# Patient Record
Sex: FEMALE | Race: WHITE | NOT HISPANIC OR LATINO | URBAN - METROPOLITAN AREA
[De-identification: names, ages, dates, MRNs, and addresses within clinical notes are randomized per-mention and may not be internally consistent; named-entity substitution may affect disease eponyms.]

---

## 2017-01-11 ENCOUNTER — APPOINTMENT (RX ONLY)
Dept: URBAN - METROPOLITAN AREA CLINIC 24 | Facility: CLINIC | Age: 62
Setting detail: DERMATOLOGY
End: 2017-01-11

## 2017-01-11 DIAGNOSIS — L82.1 OTHER SEBORRHEIC KERATOSIS: ICD-10-CM

## 2017-01-11 DIAGNOSIS — Z48.02 ENCOUNTER FOR REMOVAL OF SUTURES: ICD-10-CM

## 2017-01-11 PROCEDURE — ? SUTURE REMOVAL (GLOBAL PERIOD)

## 2017-01-11 PROCEDURE — ? PRESCRIPTION

## 2017-01-11 PROCEDURE — ? ORDER TESTS

## 2017-01-11 PROCEDURE — ? LIQUID NITROGEN (COSMETIC)

## 2017-01-11 PROCEDURE — ? OTHER

## 2017-01-11 RX ORDER — DOXYCYCLINE HYCLATE 100 MG/1
CAPSULE, GELATIN COATED ORAL
Qty: 20 | Refills: 0 | Status: ERX | COMMUNITY
Start: 2017-01-11 | End: 2019-03-12

## 2017-01-11 RX ADMIN — DOXYCYCLINE HYCLATE: 100 CAPSULE, GELATIN COATED ORAL at 00:00

## 2017-01-11 ASSESSMENT — LOCATION SIMPLE DESCRIPTION DERM
LOCATION SIMPLE: RIGHT BREAST
LOCATION SIMPLE: LEFT TEMPLE
LOCATION SIMPLE: LEFT CHEEK

## 2017-01-11 ASSESSMENT — LOCATION DETAILED DESCRIPTION DERM
LOCATION DETAILED: LEFT CENTRAL BUCCAL CHEEK
LOCATION DETAILED: RIGHT MEDIAL BREAST 12-1:00 REGION
LOCATION DETAILED: LEFT INFERIOR TEMPLE
LOCATION DETAILED: LEFT INFERIOR LATERAL MALAR CHEEK

## 2017-01-11 ASSESSMENT — LOCATION ZONE DERM
LOCATION ZONE: FACE
LOCATION ZONE: TRUNK

## 2017-01-11 NOTE — PROCEDURE: SUTURE REMOVAL (GLOBAL PERIOD)
Detail Level: Detailed
Add 39942 Cpt? (Important Note: In 2017 The Use Of 38081 Is Being Tracked By Cms To Determine Future Global Period Reimbursement For Global Periods): no

## 2017-01-11 NOTE — PROCEDURE: OTHER
Detail Level: Zone
Note Text (......Xxx Chief Complaint.): This diagnosis correlates with the
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

## 2019-03-13 ENCOUNTER — APPOINTMENT (RX ONLY)
Dept: URBAN - METROPOLITAN AREA CLINIC 24 | Facility: CLINIC | Age: 64
Setting detail: DERMATOLOGY
End: 2019-03-13

## 2019-03-13 DIAGNOSIS — L82.1 OTHER SEBORRHEIC KERATOSIS: ICD-10-CM

## 2019-03-13 DIAGNOSIS — L81.4 OTHER MELANIN HYPERPIGMENTATION: ICD-10-CM

## 2019-03-13 DIAGNOSIS — D17 BENIGN LIPOMATOUS NEOPLASM: ICD-10-CM

## 2019-03-13 DIAGNOSIS — L30.4 ERYTHEMA INTERTRIGO: ICD-10-CM

## 2019-03-13 DIAGNOSIS — Z87.2 PERSONAL HISTORY OF DISEASES OF THE SKIN AND SUBCUTANEOUS TISSUE: ICD-10-CM

## 2019-03-13 DIAGNOSIS — Z80.8 FAMILY HISTORY OF MALIGNANT NEOPLASM OF OTHER ORGANS OR SYSTEMS: ICD-10-CM

## 2019-03-13 DIAGNOSIS — D18.0 HEMANGIOMA: ICD-10-CM

## 2019-03-13 DIAGNOSIS — L57.0 ACTINIC KERATOSIS: ICD-10-CM

## 2019-03-13 DIAGNOSIS — D49.2 NEOPLASM OF UNSPECIFIED BEHAVIOR OF BONE, SOFT TISSUE, AND SKIN: ICD-10-CM

## 2019-03-13 DIAGNOSIS — D22 MELANOCYTIC NEVI: ICD-10-CM

## 2019-03-13 PROBLEM — D22.61 MELANOCYTIC NEVI OF RIGHT UPPER LIMB, INCLUDING SHOULDER: Status: ACTIVE | Noted: 2019-03-13

## 2019-03-13 PROBLEM — D48.5 NEOPLASM OF UNCERTAIN BEHAVIOR OF SKIN: Status: ACTIVE | Noted: 2019-03-13

## 2019-03-13 PROBLEM — D18.01 HEMANGIOMA OF SKIN AND SUBCUTANEOUS TISSUE: Status: ACTIVE | Noted: 2019-03-13

## 2019-03-13 PROCEDURE — ? BIOPSY BY SHAVE METHOD

## 2019-03-13 PROCEDURE — 17003 DESTRUCT PREMALG LES 2-14: CPT

## 2019-03-13 PROCEDURE — 11102 TANGNTL BX SKIN SINGLE LES: CPT | Mod: 59

## 2019-03-13 PROCEDURE — ? OTHER

## 2019-03-13 PROCEDURE — ? COUNSELING

## 2019-03-13 PROCEDURE — 17000 DESTRUCT PREMALG LESION: CPT | Mod: 59

## 2019-03-13 PROCEDURE — ? REFERRAL

## 2019-03-13 PROCEDURE — ? LIQUID NITROGEN

## 2019-03-13 PROCEDURE — ? SHAVE REMOVAL

## 2019-03-13 PROCEDURE — 11301 SHAVE SKIN LESION 0.6-1.0 CM: CPT | Mod: 59

## 2019-03-13 PROCEDURE — 99214 OFFICE O/P EST MOD 30 MIN: CPT | Mod: 25

## 2019-03-13 ASSESSMENT — LOCATION ZONE DERM
LOCATION ZONE: ARM
LOCATION ZONE: EYELID
LOCATION ZONE: TRUNK
LOCATION ZONE: LEG
LOCATION ZONE: NECK

## 2019-03-13 ASSESSMENT — LOCATION SIMPLE DESCRIPTION DERM
LOCATION SIMPLE: LEFT BREAST
LOCATION SIMPLE: LEFT SHOULDER
LOCATION SIMPLE: RIGHT UPPER BACK
LOCATION SIMPLE: RIGHT UPPER ARM
LOCATION SIMPLE: ABDOMEN
LOCATION SIMPLE: LEFT UPPER BACK
LOCATION SIMPLE: RIGHT SHOULDER
LOCATION SIMPLE: RIGHT BREAST
LOCATION SIMPLE: LEFT KNEE
LOCATION SIMPLE: RIGHT POSTERIOR UPPER ARM
LOCATION SIMPLE: LEFT FOREARM
LOCATION SIMPLE: LEFT INFERIOR EYELID
LOCATION SIMPLE: POSTERIOR NECK
LOCATION SIMPLE: CHEST

## 2019-03-13 ASSESSMENT — LOCATION DETAILED DESCRIPTION DERM
LOCATION DETAILED: RIGHT ANTERIOR PROXIMAL UPPER ARM
LOCATION DETAILED: LEFT DISTAL DORSAL FOREARM
LOCATION DETAILED: LEFT MEDIAL BREAST 6-7:00 REGION
LOCATION DETAILED: RIGHT MID-UPPER BACK
LOCATION DETAILED: RIGHT PROXIMAL POSTERIOR UPPER ARM
LOCATION DETAILED: LEFT INFERIOR UPPER BACK
LOCATION DETAILED: RIGHT POSTERIOR SHOULDER
LOCATION DETAILED: LEFT DISTAL RADIAL DORSAL FOREARM
LOCATION DETAILED: LEFT ANTERIOR SHOULDER
LOCATION DETAILED: RIGHT RIB CAGE
LOCATION DETAILED: LEFT LATERAL TRAPEZIAL NECK
LOCATION DETAILED: LEFT LATERAL INFERIOR EYELID
LOCATION DETAILED: RIGHT MEDIAL BREAST 12-1:00 REGION
LOCATION DETAILED: LEFT KNEE
LOCATION DETAILED: LEFT LATERAL SUPERIOR CHEST
LOCATION DETAILED: LEFT POSTERIOR SHOULDER
LOCATION DETAILED: LEFT PROXIMAL DORSAL FOREARM

## 2019-03-13 NOTE — PROCEDURE: BIOPSY BY SHAVE METHOD
Hemostasis: Aluminum Chloride
Wound Care: Vaseline
Electrodesiccation And Curettage Text: The wound bed was treated with electrodesiccation and curettage after the biopsy was performed.
Curettage Text: The wound bed was treated with curettage after the biopsy was performed.
Bill 29323 For Specimen Handling/Conveyance To Laboratory?: no
Dressing: bandage
Cryotherapy Text: The wound bed was treated with cryotherapy after the biopsy was performed.
Biopsy Type: H and E
Post-Care Instructions: I reviewed with the patient in detail post-care instructions. Patient is to keep the biopsy site dry overnight, and then apply vaseline twice daily until healed. Patient may apply hydrogen peroxide soaks to remove any crusting. Patient given a wound care sheet.
Size Of Lesion In Cm: 0
Biopsy Method: Personna blade
Notification Instructions: Patient will be notified of biopsy results. However, patient instructed to call the office if not contacted within 2 weeks.
Render Post-Care Instructions In Note?: yes
Consent: Written consent was obtained and risks were reviewed including but not limited to scarring, infection, bleeding, scabbing, incomplete removal, nerve damage and allergy to anesthesia.
Anesthesia Type: 1% lidocaine with epinephrine and a 1:10 solution of 8.4% sodium bicarbonate
Silver Nitrate Text: The wound bed was treated with silver nitrate after the biopsy was performed.
Type Of Destruction Used: Curettage
Electrodesiccation Text: The wound bed was treated with electrodesiccation after the biopsy was performed.
Detail Level: Detailed
Anesthesia Volume In Cc: 0.1
Depth Of Biopsy: dermis
Billing Type: Third-Party Bill

## 2019-03-13 NOTE — PROCEDURE: OTHER
Other (Free Text): Discussed probable lipoma but excision necessary for definitive diagnosis. Recommended evaluation by general surgeon as neck lesion is too too large for my comfort level.
Detail Level: Zone
Note Text (......Xxx Chief Complaint.): This diagnosis correlates with the
Other (Free Text): Mother
Other (Free Text): Offered keto cream. Pt declined. Recommended zeasorb powder.

## 2019-03-13 NOTE — PROCEDURE: LIQUID NITROGEN
Post-Care Instructions: I reviewed with the patient in detail post-care instructions. Patient is to wear sunprotection, and avoid picking at any of the treated lesions. Pt may apply Vaseline to crusted or scabbing areas.
Consent: The patient's consent was obtained including but not limited to risks of crusting, scabbing, blistering, scarring, darker or lighter pigmentary change, recurrence, incomplete removal and infection.
Number Of Freeze-Thaw Cycles: 1 freeze-thaw cycle
Render Post-Care Instructions In Note?: yes
Detail Level: Detailed
Duration Of Freeze Thaw-Cycle (Seconds): 0

## 2019-03-13 NOTE — PROCEDURE: SHAVE REMOVAL
Size Of Lesion In Cm (Required): 0.7
Biopsy Method: Personna blade
Add Variable For Additional Medical Justification: No
Medical Necessity Clause: This procedure was medically necessary because the lesion that was
Anesthesia Volume In Cc: 0.5
Was A Bandage Applied: Yes
Anesthesia Type: 1% lidocaine with epinephrine and a 1:10 solution of 8.4% sodium bicarbonate
X Size Of Lesion In Cm (Optional): 0
Notification Instructions: Patient will be notified of biopsy results. However, patient instructed to call the office if not contacted within 2 weeks.
Path Notes (To The Dermatopathologist): Please check margins.
Consent was obtained from the patient. The risks and benefits to therapy were discussed in detail. Specifically, the risks of infection, scarring, bleeding, prolonged wound healing, incomplete removal, allergy to anesthesia, nerve injury and recurrence were addressed. Prior to the procedure, the treatment site was clearly identified and confirmed by the patient. All components of Universal Protocol/PAUSE Rule completed.
Billing Type: Third-Party Bill
Wound Care: Vaseline
Medical Necessity Information: It is in your best interest to select a reason for this procedure from the list below. All of these items fulfill various CMS LCD requirements except the new and changing color options.
Hemostasis: Aluminum Chloride
Post-Care Instructions: I reviewed with the patient in detail post-care instructions. Patient is to keep the biopsy site dry overnight, and then apply vaseline twice daily until healed. Patient may apply hydrogen peroxide soaks to remove any crusting. Wound care sheet provided.
Detail Level: Detailed

## 2019-12-05 ENCOUNTER — APPOINTMENT (RX ONLY)
Dept: URBAN - METROPOLITAN AREA CLINIC 24 | Facility: CLINIC | Age: 64
Setting detail: DERMATOLOGY
End: 2019-12-05

## 2019-12-05 DIAGNOSIS — L82.1 OTHER SEBORRHEIC KERATOSIS: ICD-10-CM

## 2019-12-05 DIAGNOSIS — L81.4 OTHER MELANIN HYPERPIGMENTATION: ICD-10-CM

## 2019-12-05 DIAGNOSIS — L82.0 INFLAMED SEBORRHEIC KERATOSIS: ICD-10-CM

## 2019-12-05 PROCEDURE — 99212 OFFICE O/P EST SF 10 MIN: CPT | Mod: 25

## 2019-12-05 PROCEDURE — ? LIQUID NITROGEN

## 2019-12-05 PROCEDURE — ? RECOMMENDATIONS

## 2019-12-05 PROCEDURE — 17110 DESTRUCTION B9 LES UP TO 14: CPT

## 2019-12-05 PROCEDURE — ? COUNSELING

## 2019-12-05 ASSESSMENT — LOCATION DETAILED DESCRIPTION DERM
LOCATION DETAILED: RIGHT INFERIOR FOREHEAD
LOCATION DETAILED: LEFT INFERIOR CENTRAL MALAR CHEEK
LOCATION DETAILED: RIGHT INFRAMAMMARY CREASE (OUTER QUADRANT)
LOCATION DETAILED: LEFT FOREHEAD
LOCATION DETAILED: LEFT CENTRAL BUCCAL CHEEK

## 2019-12-05 ASSESSMENT — LOCATION SIMPLE DESCRIPTION DERM
LOCATION SIMPLE: LEFT CHEEK
LOCATION SIMPLE: RIGHT BREAST
LOCATION SIMPLE: LEFT FOREHEAD
LOCATION SIMPLE: RIGHT FOREHEAD

## 2019-12-05 ASSESSMENT — LOCATION ZONE DERM
LOCATION ZONE: TRUNK
LOCATION ZONE: FACE

## 2019-12-05 NOTE — PROCEDURE: LIQUID NITROGEN
Post-Care Instructions: I reviewed with the patient in detail post-care instructions. Patient is to wear sunprotection, and avoid picking at any of the treated lesions. Pt may apply Vaseline to crusted or scabbing areas.
Add 52 Modifier (Optional): no
Medical Necessity Information: It is in your best interest to select a reason for this procedure from the list below. All of these items fulfill various CMS LCD requirements except the new and changing color options.
Number Of Freeze-Thaw Cycles: 1 freeze-thaw cycle
Render Post-Care Instructions In Note?: yes
Detail Level: Detailed
Medical Necessity Clause: This procedure was medically necessary because the lesions that were treated were: irritated, itchy
Consent: The patient's consent was obtained including but not limited to risks of crusting, scabbing, blistering, scarring, darker or lighter pigmentary change, recurrence, incomplete removal and infection.

## 2021-03-10 ENCOUNTER — APPOINTMENT (RX ONLY)
Dept: URBAN - METROPOLITAN AREA CLINIC 24 | Facility: CLINIC | Age: 66
Setting detail: DERMATOLOGY
End: 2021-03-10

## 2021-03-10 DIAGNOSIS — Z87.2 PERSONAL HISTORY OF DISEASES OF THE SKIN AND SUBCUTANEOUS TISSUE: ICD-10-CM

## 2021-03-10 DIAGNOSIS — Z80.8 FAMILY HISTORY OF MALIGNANT NEOPLASM OF OTHER ORGANS OR SYSTEMS: ICD-10-CM

## 2021-03-10 PROCEDURE — ? COUNSELING

## 2021-03-10 PROCEDURE — ? OTHER

## 2021-03-10 ASSESSMENT — LOCATION SIMPLE DESCRIPTION DERM
LOCATION SIMPLE: RIGHT POSTERIOR UPPER ARM
LOCATION SIMPLE: RIGHT BREAST
LOCATION SIMPLE: LEFT SHOULDER

## 2021-03-10 ASSESSMENT — LOCATION ZONE DERM
LOCATION ZONE: ARM
LOCATION ZONE: TRUNK

## 2021-03-10 ASSESSMENT — LOCATION DETAILED DESCRIPTION DERM
LOCATION DETAILED: LEFT ANTERIOR SHOULDER
LOCATION DETAILED: RIGHT PROXIMAL POSTERIOR UPPER ARM
LOCATION DETAILED: RIGHT MEDIAL BREAST 12-1:00 REGION

## 2021-03-10 NOTE — HPI: FULL BODY SKIN EXAMINATION
What Is The Reason For Today's Visit?: Full Body Skin Examination
What Is The Reason For Today's Visit? (Being Monitored For X): concerning skin lesions on an annual basis
How Severe Are Your Spot(S)?: mild
Additional History: Pt gave verbal consent for treatment/bx today. Witnessed by nikolas

## 2021-03-10 NOTE — PROCEDURE: OTHER
Note Text (......Xxx Chief Complaint.): This diagnosis correlates with the
Other (Free Text): Mother
Detail Level: Zone

## 2021-04-28 ENCOUNTER — APPOINTMENT (RX ONLY)
Dept: URBAN - METROPOLITAN AREA CLINIC 24 | Facility: CLINIC | Age: 66
Setting detail: DERMATOLOGY
End: 2021-04-28

## 2021-04-28 DIAGNOSIS — Z87.2 PERSONAL HISTORY OF DISEASES OF THE SKIN AND SUBCUTANEOUS TISSUE: ICD-10-CM

## 2021-04-28 DIAGNOSIS — Z80.8 FAMILY HISTORY OF MALIGNANT NEOPLASM OF OTHER ORGANS OR SYSTEMS: ICD-10-CM

## 2021-04-28 PROCEDURE — ? COUNSELING

## 2021-04-28 PROCEDURE — ? OTHER

## 2021-04-28 ASSESSMENT — LOCATION SIMPLE DESCRIPTION DERM
LOCATION SIMPLE: RIGHT BREAST
LOCATION SIMPLE: RIGHT POSTERIOR UPPER ARM
LOCATION SIMPLE: LEFT SHOULDER

## 2021-04-28 ASSESSMENT — LOCATION ZONE DERM
LOCATION ZONE: ARM
LOCATION ZONE: TRUNK

## 2021-04-28 NOTE — PROCEDURE: OTHER
Detail Level: Zone
Other (Free Text): Mother
Note Text (......Xxx Chief Complaint.): This diagnosis correlates with the

## 2021-05-26 ENCOUNTER — HOSPITAL ENCOUNTER (OUTPATIENT)
Dept: MAMMOGRAPHY | Age: 66
Discharge: HOME | End: 2021-05-26
Attending: FAMILY MEDICINE
Payer: MEDICARE

## 2021-05-26 DIAGNOSIS — Z12.31 VISIT FOR SCREENING MAMMOGRAM: ICD-10-CM

## 2021-05-26 PROCEDURE — 77067 SCR MAMMO BI INCL CAD: CPT

## 2021-07-29 ENCOUNTER — APPOINTMENT (RX ONLY)
Dept: URBAN - METROPOLITAN AREA CLINIC 24 | Facility: CLINIC | Age: 66
Setting detail: DERMATOLOGY
End: 2021-07-29

## 2021-07-29 DIAGNOSIS — Z87.2 PERSONAL HISTORY OF DISEASES OF THE SKIN AND SUBCUTANEOUS TISSUE: ICD-10-CM

## 2021-07-29 DIAGNOSIS — Z80.8 FAMILY HISTORY OF MALIGNANT NEOPLASM OF OTHER ORGANS OR SYSTEMS: ICD-10-CM

## 2021-07-29 DIAGNOSIS — D17 BENIGN LIPOMATOUS NEOPLASM: ICD-10-CM

## 2021-07-29 DIAGNOSIS — D49.2 NEOPLASM OF UNSPECIFIED BEHAVIOR OF BONE, SOFT TISSUE, AND SKIN: ICD-10-CM

## 2021-07-29 PROCEDURE — ? REFERRAL

## 2021-07-29 PROCEDURE — ? OTHER

## 2021-07-29 PROCEDURE — ? COUNSELING

## 2021-07-29 ASSESSMENT — LOCATION DETAILED DESCRIPTION DERM
LOCATION DETAILED: RIGHT ANTERIOR PROXIMAL UPPER ARM
LOCATION DETAILED: LEFT LATERAL INFERIOR EYELID
LOCATION DETAILED: RIGHT PROXIMAL POSTERIOR UPPER ARM
LOCATION DETAILED: LEFT ANTERIOR SHOULDER
LOCATION DETAILED: RIGHT MEDIAL BREAST 12-1:00 REGION
LOCATION DETAILED: LEFT LATERAL TRAPEZIAL NECK

## 2021-07-29 ASSESSMENT — LOCATION SIMPLE DESCRIPTION DERM
LOCATION SIMPLE: RIGHT UPPER ARM
LOCATION SIMPLE: POSTERIOR NECK
LOCATION SIMPLE: RIGHT BREAST
LOCATION SIMPLE: RIGHT POSTERIOR UPPER ARM
LOCATION SIMPLE: LEFT SHOULDER
LOCATION SIMPLE: LEFT INFERIOR EYELID

## 2021-07-29 ASSESSMENT — LOCATION ZONE DERM
LOCATION ZONE: NECK
LOCATION ZONE: ARM
LOCATION ZONE: EYELID
LOCATION ZONE: TRUNK

## 2021-07-29 NOTE — PROCEDURE: OTHER
Note Text (......Xxx Chief Complaint.): This diagnosis correlates with the
Other (Free Text): Discussed probable lipoma but excision necessary for definitive diagnosis. Recommended evaluation by general surgeon as neck lesion is too too large for my comfort level.
Detail Level: Zone
Other (Free Text): Mother

## 2021-08-07 ENCOUNTER — HOSPITAL ENCOUNTER (INPATIENT)
Age: 66
LOS: 2 days | Discharge: HOME | DRG: 246 | End: 2021-08-09
Attending: STUDENT IN AN ORGANIZED HEALTH CARE EDUCATION/TRAINING PROGRAM | Admitting: INTERNAL MEDICINE
Payer: MEDICARE

## 2021-08-07 DIAGNOSIS — I21.3 ACUTE ST ELEVATION MYOCARDIAL INFARCTION (STEMI), UNSPECIFIED ARTERY (HCC): Primary | ICD-10-CM

## 2021-08-07 DIAGNOSIS — I21.4 NSTEMI (NON-ST ELEVATED MYOCARDIAL INFARCTION) (HCC): ICD-10-CM

## 2021-08-07 LAB
ACT BLD: 235 SECS (ref 70–128)
ACT BLD: 241 SECS (ref 70–128)
ACT BLD: 362 SECS (ref 70–128)
ALBUMIN SERPL-MCNC: 3.3 G/DL (ref 3.2–4.6)
ALBUMIN/GLOB SERPL: 1 {RATIO} (ref 1.2–3.5)
ALP SERPL-CCNC: 46 U/L (ref 50–136)
ALT SERPL-CCNC: 158 U/L (ref 12–65)
ANION GAP SERPL CALC-SCNC: 11 MMOL/L (ref 7–16)
APTT PPP: 96.6 SEC (ref 24.1–35.1)
AST SERPL-CCNC: 150 U/L (ref 15–37)
BASOPHILS # BLD: 0.1 K/UL (ref 0–0.2)
BASOPHILS NFR BLD: 1 % (ref 0–2)
BILIRUB SERPL-MCNC: 0.2 MG/DL (ref 0.2–1.1)
BUN SERPL-MCNC: 13 MG/DL (ref 8–23)
CALCIUM SERPL-MCNC: 8.8 MG/DL (ref 8.3–10.4)
CHLORIDE SERPL-SCNC: 108 MMOL/L (ref 98–107)
CO2 SERPL-SCNC: 21 MMOL/L (ref 21–32)
CREAT SERPL-MCNC: 0.71 MG/DL (ref 0.6–1)
DIFFERENTIAL METHOD BLD: ABNORMAL
EOSINOPHIL # BLD: 0.1 K/UL (ref 0–0.8)
EOSINOPHIL NFR BLD: 1 % (ref 0.5–7.8)
ERYTHROCYTE [DISTWIDTH] IN BLOOD BY AUTOMATED COUNT: 14.4 % (ref 11.9–14.6)
GLOBULIN SER CALC-MCNC: 3.2 G/DL (ref 2.3–3.5)
GLUCOSE BLD STRIP.AUTO-MCNC: 168 MG/DL (ref 65–100)
GLUCOSE SERPL-MCNC: 99 MG/DL (ref 65–100)
HCT VFR BLD AUTO: 53.4 % (ref 35.8–46.3)
HGB BLD-MCNC: 17.3 G/DL (ref 11.7–15.4)
IMM GRANULOCYTES # BLD AUTO: 0.1 K/UL (ref 0–0.5)
IMM GRANULOCYTES NFR BLD AUTO: 1 % (ref 0–5)
LYMPHOCYTES # BLD: 2.9 K/UL (ref 0.5–4.6)
LYMPHOCYTES NFR BLD: 20 % (ref 13–44)
MCH RBC QN AUTO: 30.3 PG (ref 26.1–32.9)
MCHC RBC AUTO-ENTMCNC: 32.4 G/DL (ref 31.4–35)
MCV RBC AUTO: 93.5 FL (ref 79.6–97.8)
MONOCYTES # BLD: 1.1 K/UL (ref 0.1–1.3)
MONOCYTES NFR BLD: 7 % (ref 4–12)
NEUTS SEG # BLD: 10.5 K/UL (ref 1.7–8.2)
NEUTS SEG NFR BLD: 71 % (ref 43–78)
NRBC # BLD: 0 K/UL (ref 0–0.2)
PLATELET # BLD AUTO: 207 K/UL (ref 150–450)
PMV BLD AUTO: 9.8 FL (ref 9.4–12.3)
POTASSIUM SERPL-SCNC: 3.9 MMOL/L (ref 3.5–5.1)
PROT SERPL-MCNC: 6.5 G/DL (ref 6.3–8.2)
RBC # BLD AUTO: 5.71 M/UL (ref 4.05–5.2)
SERVICE CMNT-IMP: ABNORMAL
SODIUM SERPL-SCNC: 140 MMOL/L (ref 136–145)
TROPONIN-HIGH SENSITIVITY: ABNORMAL PG/ML (ref 0–14)
TSH SERPL DL<=0.005 MIU/L-ACNC: 1.04 UIU/ML (ref 0.36–3.74)
WBC # BLD AUTO: 14.8 K/UL (ref 4.3–11.1)

## 2021-08-07 PROCEDURE — 85730 THROMBOPLASTIN TIME PARTIAL: CPT

## 2021-08-07 PROCEDURE — C1769 GUIDE WIRE: HCPCS | Performed by: INTERNAL MEDICINE

## 2021-08-07 PROCEDURE — 027034Z DILATION OF CORONARY ARTERY, ONE ARTERY WITH DRUG-ELUTING INTRALUMINAL DEVICE, PERCUTANEOUS APPROACH: ICD-10-PCS | Performed by: INTERNAL MEDICINE

## 2021-08-07 PROCEDURE — 74011250636 HC RX REV CODE- 250/636: Performed by: STUDENT IN AN ORGANIZED HEALTH CARE EDUCATION/TRAINING PROGRAM

## 2021-08-07 PROCEDURE — 84443 ASSAY THYROID STIM HORMONE: CPT

## 2021-08-07 PROCEDURE — 74011000250 HC RX REV CODE- 250: Performed by: INTERNAL MEDICINE

## 2021-08-07 PROCEDURE — 80053 COMPREHEN METABOLIC PANEL: CPT

## 2021-08-07 PROCEDURE — C1760 CLOSURE DEV, VASC: HCPCS | Performed by: INTERNAL MEDICINE

## 2021-08-07 PROCEDURE — C1894 INTRO/SHEATH, NON-LASER: HCPCS | Performed by: INTERNAL MEDICINE

## 2021-08-07 PROCEDURE — 51798 US URINE CAPACITY MEASURE: CPT

## 2021-08-07 PROCEDURE — 99152 MOD SED SAME PHYS/QHP 5/>YRS: CPT | Performed by: INTERNAL MEDICINE

## 2021-08-07 PROCEDURE — 99153 MOD SED SAME PHYS/QHP EA: CPT | Performed by: INTERNAL MEDICINE

## 2021-08-07 PROCEDURE — C1874 STENT, COATED/COV W/DEL SYS: HCPCS | Performed by: INTERNAL MEDICINE

## 2021-08-07 PROCEDURE — 85025 COMPLETE CBC W/AUTO DIFF WBC: CPT

## 2021-08-07 PROCEDURE — 74011000636 HC RX REV CODE- 636: Performed by: INTERNAL MEDICINE

## 2021-08-07 PROCEDURE — 96375 TX/PRO/DX INJ NEW DRUG ADDON: CPT

## 2021-08-07 PROCEDURE — 74011636637 HC RX REV CODE- 636/637: Performed by: PHYSICIAN ASSISTANT

## 2021-08-07 PROCEDURE — 93458 L HRT ARTERY/VENTRICLE ANGIO: CPT | Performed by: INTERNAL MEDICINE

## 2021-08-07 PROCEDURE — 77030012468 HC VLV BLEEDBK CNTRL ABBT -B: Performed by: INTERNAL MEDICINE

## 2021-08-07 PROCEDURE — 77030042317 HC BND COMPR HEMSTAT -B: Performed by: INTERNAL MEDICINE

## 2021-08-07 PROCEDURE — C1887 CATHETER, GUIDING: HCPCS | Performed by: INTERNAL MEDICINE

## 2021-08-07 PROCEDURE — 99223 1ST HOSP IP/OBS HIGH 75: CPT | Performed by: INTERNAL MEDICINE

## 2021-08-07 PROCEDURE — 93005 ELECTROCARDIOGRAM TRACING: CPT | Performed by: INTERNAL MEDICINE

## 2021-08-07 PROCEDURE — 85347 COAGULATION TIME ACTIVATED: CPT

## 2021-08-07 PROCEDURE — 74011250637 HC RX REV CODE- 250/637: Performed by: PHYSICIAN ASSISTANT

## 2021-08-07 PROCEDURE — 99284 EMERGENCY DEPT VISIT MOD MDM: CPT

## 2021-08-07 PROCEDURE — 96365 THER/PROPH/DIAG IV INF INIT: CPT

## 2021-08-07 PROCEDURE — 36415 COLL VENOUS BLD VENIPUNCTURE: CPT

## 2021-08-07 PROCEDURE — 2709999900 HC NON-CHARGEABLE SUPPLY

## 2021-08-07 PROCEDURE — 74011250637 HC RX REV CODE- 250/637: Performed by: INTERNAL MEDICINE

## 2021-08-07 PROCEDURE — 65610000001 HC ROOM ICU GENERAL

## 2021-08-07 PROCEDURE — B2151ZZ FLUOROSCOPY OF LEFT HEART USING LOW OSMOLAR CONTRAST: ICD-10-PCS | Performed by: INTERNAL MEDICINE

## 2021-08-07 PROCEDURE — C1725 CATH, TRANSLUMIN NON-LASER: HCPCS | Performed by: INTERNAL MEDICINE

## 2021-08-07 PROCEDURE — 92928 PRQ TCAT PLMT NTRAC ST 1 LES: CPT | Performed by: INTERNAL MEDICINE

## 2021-08-07 PROCEDURE — B2111ZZ FLUOROSCOPY OF MULTIPLE CORONARY ARTERIES USING LOW OSMOLAR CONTRAST: ICD-10-PCS | Performed by: INTERNAL MEDICINE

## 2021-08-07 PROCEDURE — 74011250637 HC RX REV CODE- 250/637: Performed by: NURSE PRACTITIONER

## 2021-08-07 PROCEDURE — 82962 GLUCOSE BLOOD TEST: CPT

## 2021-08-07 PROCEDURE — 4A023N7 MEASUREMENT OF CARDIAC SAMPLING AND PRESSURE, LEFT HEART, PERCUTANEOUS APPROACH: ICD-10-PCS | Performed by: INTERNAL MEDICINE

## 2021-08-07 PROCEDURE — 77030019905 HC CATH URETH INTMIT MDII -A

## 2021-08-07 PROCEDURE — 77030016699 HC CATH ANGI DX INFN1 CARD -A: Performed by: INTERNAL MEDICINE

## 2021-08-07 PROCEDURE — 84484 ASSAY OF TROPONIN QUANT: CPT

## 2021-08-07 PROCEDURE — 74011250636 HC RX REV CODE- 250/636: Performed by: INTERNAL MEDICINE

## 2021-08-07 DEVICE — STENT COR DES 3.50X18MM -- DES RESOLUTE ONYX: Type: IMPLANTABLE DEVICE | Status: FUNCTIONAL

## 2021-08-07 RX ORDER — SODIUM CHLORIDE 0.9 % (FLUSH) 0.9 %
5-40 SYRINGE (ML) INJECTION AS NEEDED
Status: DISCONTINUED | OUTPATIENT
Start: 2021-08-07 | End: 2021-08-09 | Stop reason: HOSPADM

## 2021-08-07 RX ORDER — LORAZEPAM 1 MG/1
1 TABLET ORAL
Status: DISCONTINUED | OUTPATIENT
Start: 2021-08-07 | End: 2021-08-09 | Stop reason: HOSPADM

## 2021-08-07 RX ORDER — GUAIFENESIN 100 MG/5ML
81 LIQUID (ML) ORAL DAILY
Status: DISCONTINUED | OUTPATIENT
Start: 2021-08-08 | End: 2021-08-09 | Stop reason: HOSPADM

## 2021-08-07 RX ORDER — NITROGLYCERIN 0.4 MG/1
0.4 TABLET SUBLINGUAL
Status: DISCONTINUED | OUTPATIENT
Start: 2021-08-07 | End: 2021-08-09 | Stop reason: HOSPADM

## 2021-08-07 RX ORDER — PRAVASTATIN SODIUM 80 MG/1
80 TABLET ORAL
Status: DISCONTINUED | OUTPATIENT
Start: 2021-08-07 | End: 2021-08-09 | Stop reason: HOSPADM

## 2021-08-07 RX ORDER — LOSARTAN POTASSIUM 25 MG/1
25 TABLET ORAL DAILY
Status: DISCONTINUED | OUTPATIENT
Start: 2021-08-08 | End: 2021-08-09 | Stop reason: HOSPADM

## 2021-08-07 RX ORDER — CYCLOBENZAPRINE HCL 10 MG
10 TABLET ORAL
COMMUNITY
Start: 2021-07-19

## 2021-08-07 RX ORDER — GLIPIZIDE 5 MG/1
5 TABLET, FILM COATED, EXTENDED RELEASE ORAL DAILY
COMMUNITY
Start: 2021-07-21

## 2021-08-07 RX ORDER — ALPRAZOLAM 0.5 MG/1
2 TABLET ORAL
Status: DISCONTINUED | OUTPATIENT
Start: 2021-08-07 | End: 2021-08-09 | Stop reason: HOSPADM

## 2021-08-07 RX ORDER — GLIPIZIDE 5 MG/1
5 TABLET ORAL
Status: DISCONTINUED | OUTPATIENT
Start: 2021-08-08 | End: 2021-08-09 | Stop reason: HOSPADM

## 2021-08-07 RX ORDER — HEPARIN SODIUM 5000 [USP'U]/ML
60 INJECTION, SOLUTION INTRAVENOUS; SUBCUTANEOUS ONCE
Status: DISCONTINUED | OUTPATIENT
Start: 2021-08-07 | End: 2021-08-07

## 2021-08-07 RX ORDER — GUAIFENESIN 100 MG/5ML
81 LIQUID (ML) ORAL DAILY
Status: DISCONTINUED | OUTPATIENT
Start: 2021-08-08 | End: 2021-08-07 | Stop reason: SDUPTHER

## 2021-08-07 RX ORDER — HEPARIN SODIUM 200 [USP'U]/100ML
INJECTION, SOLUTION INTRAVENOUS
Status: COMPLETED | OUTPATIENT
Start: 2021-08-07 | End: 2021-08-07

## 2021-08-07 RX ORDER — HEPARIN SODIUM 5000 [USP'U]/ML
5000 INJECTION, SOLUTION INTRAVENOUS; SUBCUTANEOUS ONCE
Status: COMPLETED | OUTPATIENT
Start: 2021-08-07 | End: 2021-08-07

## 2021-08-07 RX ORDER — LIDOCAINE HYDROCHLORIDE 10 MG/ML
INJECTION INFILTRATION; PERINEURAL AS NEEDED
Status: DISCONTINUED | OUTPATIENT
Start: 2021-08-07 | End: 2021-08-07 | Stop reason: HOSPADM

## 2021-08-07 RX ORDER — MORPHINE SULFATE 2 MG/ML
1 INJECTION, SOLUTION INTRAMUSCULAR; INTRAVENOUS
Status: DISCONTINUED | OUTPATIENT
Start: 2021-08-07 | End: 2021-08-09 | Stop reason: HOSPADM

## 2021-08-07 RX ORDER — CYCLOBENZAPRINE HCL 10 MG
10 TABLET ORAL
Status: DISCONTINUED | OUTPATIENT
Start: 2021-08-07 | End: 2021-08-09 | Stop reason: HOSPADM

## 2021-08-07 RX ORDER — DULOXETIN HYDROCHLORIDE 60 MG/1
60 CAPSULE, DELAYED RELEASE ORAL DAILY
COMMUNITY
Start: 2021-07-21

## 2021-08-07 RX ORDER — TRAZODONE HYDROCHLORIDE 50 MG/1
100 TABLET ORAL
COMMUNITY

## 2021-08-07 RX ORDER — METOPROLOL TARTRATE 25 MG/1
12.5 TABLET, FILM COATED ORAL 2 TIMES DAILY
Status: DISCONTINUED | OUTPATIENT
Start: 2021-08-08 | End: 2021-08-09 | Stop reason: HOSPADM

## 2021-08-07 RX ORDER — LOSARTAN POTASSIUM 100 MG/1
100 TABLET ORAL DAILY
COMMUNITY
Start: 2021-07-21

## 2021-08-07 RX ORDER — TRIAMTERENE AND HYDROCHLOROTHIAZIDE 75; 50 MG/1; MG/1
1 TABLET ORAL DAILY
COMMUNITY
Start: 2021-07-21 | End: 2021-10-27 | Stop reason: ALTCHOICE

## 2021-08-07 RX ORDER — ALPRAZOLAM 1 MG/1
TABLET ORAL
COMMUNITY
Start: 2021-07-25

## 2021-08-07 RX ORDER — SODIUM CHLORIDE 0.9 % (FLUSH) 0.9 %
5-40 SYRINGE (ML) INJECTION EVERY 8 HOURS
Status: DISCONTINUED | OUTPATIENT
Start: 2021-08-07 | End: 2021-08-09 | Stop reason: HOSPADM

## 2021-08-07 RX ORDER — ACETAMINOPHEN 325 MG/1
650 TABLET ORAL
Status: DISCONTINUED | OUTPATIENT
Start: 2021-08-07 | End: 2021-08-09 | Stop reason: HOSPADM

## 2021-08-07 RX ORDER — EMPAGLIFLOZIN 25 MG/1
25 TABLET, FILM COATED ORAL DAILY
COMMUNITY
Start: 2021-07-21 | End: 2021-11-09

## 2021-08-07 RX ORDER — NITROGLYCERIN 0.4 MG/1
0.4 TABLET SUBLINGUAL ONCE
Status: COMPLETED | OUTPATIENT
Start: 2021-08-07 | End: 2021-08-07

## 2021-08-07 RX ORDER — FENTANYL CITRATE 50 UG/ML
INJECTION, SOLUTION INTRAMUSCULAR; INTRAVENOUS AS NEEDED
Status: DISCONTINUED | OUTPATIENT
Start: 2021-08-07 | End: 2021-08-07 | Stop reason: HOSPADM

## 2021-08-07 RX ORDER — METFORMIN HYDROCHLORIDE 500 MG/1
1000 TABLET, EXTENDED RELEASE ORAL 2 TIMES DAILY WITH MEALS
COMMUNITY
Start: 2021-07-23

## 2021-08-07 RX ORDER — ALPRAZOLAM 0.5 MG/1
0.5 TABLET ORAL
Status: DISCONTINUED | OUTPATIENT
Start: 2021-08-07 | End: 2021-08-07

## 2021-08-07 RX ORDER — NITROGLYCERIN 0.4 MG/1
0.4 TABLET SUBLINGUAL
Status: DISCONTINUED | OUTPATIENT
Start: 2021-08-07 | End: 2021-08-07 | Stop reason: SDUPTHER

## 2021-08-07 RX ORDER — HEPARIN SODIUM 5000 [USP'U]/100ML
12-25 INJECTION, SOLUTION INTRAVENOUS
Status: DISCONTINUED | OUTPATIENT
Start: 2021-08-07 | End: 2021-08-09

## 2021-08-07 RX ORDER — TRAZODONE HYDROCHLORIDE 50 MG/1
50 TABLET ORAL
Status: DISCONTINUED | OUTPATIENT
Start: 2021-08-07 | End: 2021-08-09 | Stop reason: HOSPADM

## 2021-08-07 RX ORDER — IBUPROFEN 800 MG/1
TABLET ORAL
COMMUNITY
Start: 2021-07-19 | End: 2021-08-09

## 2021-08-07 RX ORDER — ONDANSETRON 4 MG/1
4 TABLET, ORALLY DISINTEGRATING ORAL
COMMUNITY
Start: 2021-07-22 | End: 2021-11-09

## 2021-08-07 RX ORDER — MIDAZOLAM HYDROCHLORIDE 1 MG/ML
INJECTION, SOLUTION INTRAMUSCULAR; INTRAVENOUS AS NEEDED
Status: DISCONTINUED | OUTPATIENT
Start: 2021-08-07 | End: 2021-08-07 | Stop reason: HOSPADM

## 2021-08-07 RX ORDER — HEPARIN SODIUM 10000 [USP'U]/ML
INJECTION, SOLUTION INTRAVENOUS; SUBCUTANEOUS AS NEEDED
Status: DISCONTINUED | OUTPATIENT
Start: 2021-08-07 | End: 2021-08-07 | Stop reason: HOSPADM

## 2021-08-07 RX ORDER — DULOXETIN HYDROCHLORIDE 60 MG/1
60 CAPSULE, DELAYED RELEASE ORAL DAILY
Status: DISCONTINUED | OUTPATIENT
Start: 2021-08-08 | End: 2021-08-09 | Stop reason: HOSPADM

## 2021-08-07 RX ORDER — PRAVASTATIN SODIUM 40 MG/1
40 TABLET ORAL
COMMUNITY
Start: 2021-07-21

## 2021-08-07 RX ORDER — SODIUM CHLORIDE 9 MG/ML
75 INJECTION, SOLUTION INTRAVENOUS CONTINUOUS
Status: DISCONTINUED | OUTPATIENT
Start: 2021-08-07 | End: 2021-08-09 | Stop reason: HOSPADM

## 2021-08-07 RX ORDER — ALPRAZOLAM 0.5 MG/1
0.5 TABLET ORAL
Status: DISCONTINUED | OUTPATIENT
Start: 2021-08-07 | End: 2021-08-09 | Stop reason: HOSPADM

## 2021-08-07 RX ADMIN — TRAZODONE HYDROCHLORIDE 50 MG: 50 TABLET ORAL at 21:15

## 2021-08-07 RX ADMIN — Medication 10 ML: at 18:19

## 2021-08-07 RX ADMIN — ALPRAZOLAM 2 MG: 0.5 TABLET ORAL at 21:14

## 2021-08-07 RX ADMIN — Medication 10 ML: at 18:55

## 2021-08-07 RX ADMIN — SODIUM CHLORIDE 500 ML: 900 INJECTION, SOLUTION INTRAVENOUS at 18:17

## 2021-08-07 RX ADMIN — NITROGLYCERIN 0.4 MG: 0.4 TABLET, ORALLY DISINTEGRATING SUBLINGUAL at 15:18

## 2021-08-07 RX ADMIN — HEPARIN SODIUM 5000 UNITS: 5000 INJECTION INTRAVENOUS; SUBCUTANEOUS at 15:15

## 2021-08-07 RX ADMIN — Medication 10 ML: at 22:21

## 2021-08-07 RX ADMIN — ACETAMINOPHEN 650 MG: 325 TABLET ORAL at 18:25

## 2021-08-07 RX ADMIN — HEPARIN SODIUM AND DEXTROSE 12 UNITS/KG/HR: 5000; 5 INJECTION INTRAVENOUS at 15:43

## 2021-08-07 RX ADMIN — PRAVASTATIN SODIUM 80 MG: 20 TABLET ORAL at 21:15

## 2021-08-07 RX ADMIN — SODIUM CHLORIDE 75 ML/HR: 900 INJECTION, SOLUTION INTRAVENOUS at 18:56

## 2021-08-07 RX ADMIN — INSULIN HUMAN 2 UNITS: 100 INJECTION, SOLUTION PARENTERAL at 21:15

## 2021-08-07 NOTE — Clinical Note
TRANSFER - OUT REPORT:     Verbal report given to: ICU RN. Report consisted of patient's Situation, Background, Assessment and   Recommendations(SBAR). Opportunity for questions and clarification was provided. Patient transported to: ICU , 3104.

## 2021-08-07 NOTE — H&P
[facility] Cardiology History & Physical      Date of  Admission: 8/7/2021  2:45 PM     Primary Care Physician: Dr. Judge De Los Santos  Primary Cardiologist: none  Referring Physician: Dr. Erica Heaton [facility] ED)  Admitting Physician: Dr. Laila Ramirez    CC: back pain with radiation to neck/shoulder, STEMI    HPI:  Donn Mayen is a 73 y.o. obese WF with h/o HTN, dyslipidemia, DM II, depression/anxiety and tobacco abuse who presented to [facility] ED via EMS for c/o 2 days of upper back pain that radiates to her neck and shoulder with associated SOB and diaphoresis. ECG showed mild ST elevation in inferior leads and STEMI protocol was called. Pt was given ASA, IV heparin, SL NTG with improvement in pain. Labs pending. Plan emergent LHC for evaluation of ischemia.        Past Medical History:   Diagnosis Date    Hypertension     Dyslipidemia     Diabetes Mellitus type II    Depression/anxiety    Tobacco abuse      Past Surgical History:   Procedure Laterality Date    HX APPENDECTOMY      HX BREAST BIOPSY Right     HX CHOLECYSTECTOMY      HX GYN      hysterectomy       Allergies   Allergen Reactions    Codeine Other (comments)     Prevents sleep    Lortab [Hydrocodone-Acetaminophen] Other (comments)     Tearful      Pcn [Penicillins] Unknown (comments)    Tylox [Oxycodone-Acetaminophen] Itching      Social History     Socioeconomic History    Marital status: SINGLE     Spouse name: Not on file    Number of children: Not on file    Years of education: Not on file    Highest education level: Not on file   Occupational History    Not on file   Tobacco Use    Smoking status: Current Every Day Smoker     Packs/day: 1.50     Years: 37.00     Pack years: 55.50   Substance and Sexual Activity    Alcohol use: No    Drug use: No    Sexual activity: Not on file   Other Topics Concern    Not on file   Social History Narrative    Not on file     Social Determinants of Health     Financial Resource Strain:     Difficulty of Paying Living Expenses:    Food Insecurity:     Worried About Running Out of Food in the Last Year:     [address] in the Last Year:    Transportation Needs:     Lack of Transportation (Medical):      Lack of Transportation (Non-Medical):    Physical Activity:     Days of Exercise per Week:     Minutes of Exercise per Session:    Stress:     Feeling of Stress :    Social Connections:     Frequency of Communication with Friends and Family:     Frequency of Social Gatherings with Friends and Family:     Attends Spiritism Services:     Active Member of Clubs or Organizations:     Attends Club or Organization Meetings:     Marital Status:    Intimate Partner Violence:     Fear of Current or Ex-Partner:     Emotionally Abused:     Physically Abused:     Sexually Abused:      Family History   Problem Relation Age of Onset    Breast Cancer Sister 62    Breast Cancer Sister 76        Review of symptoms:  General: no recent weight loss/gain, weakness, +fatigue, no fever or chills   Skin: no rashes, lumps, or other skin changes   HEENT: no headache, dizziness, lightheadedness, vision changes, hearing changes, tinnitus, vertigo, sinus pressure/pain, bleeding gums, sore throat, or hoarseness   Neck: no swollen glands, goiter, pain or stiffness   Respiratory: no cough, sputum, hemoptysis, +dyspnea, wheezing   Cardiovascular: +back pain with radiation to neck/shoulder, +diaphoresis, no chest pain or discomfort, no palpitations, +dyspnea, no orthopnea, paroxysmal nocturnal dyspnea or peripheral edema   Gastrointestinal: no trouble swallowing, heartburn, change of appetite, nausea, change in bowel habits, pain with defecation, rectal bleeding or black/tarry stools, hemorrhoids, constipation, diarrhea, abdominal pain, jaundice, liver or gallbladder problems   Urinary: no frequency, urgency , hematuria, burning/pain with urination, recent flank pain, polyuria, nocturia, or difficulty urinating   Genital: no vaginal or pelvic infections   Peripheral Vascular: no claudication, leg cramps, prior DVTs, swelling of calves, legs, or feet, color change, or swelling with redness or tenderness   Musculoskeletal: no muscle or joint pain/stiffness, joint swelling, erythema of joints, or back pain   Psychiatric: no depression, mental disorders, or excessive stress   Neurological: no history of CVA, dizziness, no sensory or motor loss, seizures, syncope, tremors, numbness, tingling, no changes in mood, attention, or speech, no changes in orientation, memory, insight, or judgment. no headache, vertigo. Hematologic: no anemia, easy bruising or bleeding   Endocrine: no diabetes, thyroid problems, heat or cold intolerance, excessive sweating, polyuria, polydipsia      Subjective:   Physical Exam    Visit Vitals  BP (!) 118/57 (BP 1 Location: Left arm, BP Patient Position: At rest)   Pulse 75   Temp 98.5 °F (36.9 °C)   Resp 18   Ht 5' 4\" (1.626 m)   Wt 93.9 kg (207 lb)   SpO2 94%   BMI 35.53 kg/m²     General Appearance:  Well developed, well nourished, alert and oriented x 3, and individual in no acute distress. Ears/Nose/Mouth/Throat:   Hearing grossly normal.         Neck: Supple. Chest:   Lungs clear to auscultation bilaterally. Cardiovascular:  Regular rate and rhythm, S1, S2 normal, no murmur. Abdomen:   Soft, non-tender, bowel sounds are active. Extremities: No edema bilaterally. Skin: Warm and dry.            Labs: pending    Pt has been seen and examined by Dr. Jose Castro and he agrees with the following assessment and plan:     Assessment/Plan:       1) STEMI- inferior ST elevation- emergent LHC, admit to ICU- check labs and echo, further recommendations based on [facility] findings     2) HTN- controlled, hold home hydrochlorothiazide with plan for C     3) Dyslipidemia- statin, lipid profile in AM     4) DM type II- POC glucose, oral home meds except Metformin (hold for [facility]), SSI     5) Depression/aniety- continue home Cymbalta and Xanax prn      6) Tobacco abuse- cessation recommended      Dr. Luisa Greene, PA-C

## 2021-08-07 NOTE — Clinical Note
Lesion located in the Proximal RCA. Balloon inserted. Balloon inflated using single inflation technique. Lesion #1: Pressure = 8 divine; Duration = 18 sec.

## 2021-08-07 NOTE — PROGRESS NOTES
TRANSFER - IN REPORT:    Verbal report received from [facility]  on [facility]  being received from Cath Lab for routine progression of care      Report consisted of patients Situation, Background, Assessment and   Recommendations(SBAR). Information from the following report(s) SBAR, Kardex, ED Summary, Procedure Summary, MAR and Cardiac Rhythm NSR was reviewed with the receiving nurse. Opportunity for questions and clarification was provided. Assessment completed upon patients arrival to unit and care assumed.

## 2021-08-07 NOTE — ED TRIAGE NOTES
Pt arrives via [facility] from [facility] in [place]. Pt complains of mid-upper thoracic back pain and nausea onset yesterday. Reports associated neck pain. Urgent care noted an abnormal EKG and called for EMS. Upon EMS arrival EMS administered 324 ASA. While en route with EMS pt became hypotensive at 80/50. EMS administered 650ml of fluid. Pt denies CP and SOB.

## 2021-08-07 NOTE — Clinical Note
Lesion located in the Proximal RCA. Balloon inserted. Balloon inflated using single inflation technique. Lesion #1: Pressure = 16 divine; Duration = 18 sec.

## 2021-08-07 NOTE — ROUTINE PROCESS
TRANSFER - OUT REPORT:    [facility] with PCI  Dr. Bear Flank  RRA/RFA access    Versed 4mg, fentanyl 100mcg, total heparin 47030 units, brilinta 180mg  SINA stent RCA  Attempted RRA, unable to cannulate all arteries due to tortuous subclavian  R band placed right wrist with 14ml air  6FR RFA closed with angioseal, site soft without oozing    Verbal report given to Tico RN(name) on Marga L Pack  being transferred to ICU(unit) for routine progression of care       Report consisted of patients Situation, Background, Assessment and   Recommendations(SBAR). Information from the following report(s) Procedure Summary was reviewed with the receiving nurse. Lines:   Peripheral IV 08/07/21 Left;Posterior Hand (Active)        Opportunity for questions and clarification was provided.       Patient transported with:   Registered Nurse

## 2021-08-07 NOTE — ED PROVIDER NOTES
58-year-old female presents to the emergency department via EMS for upper back pain that radiates to her neck which began yesterday. Associated with nausea. States yesterday she had episodes of diaphoresis. States symptoms began approximate 5 PM at rest.  Denies history of CAD. She reports today symptoms have continued although they have slightly improved they are still present. Describes the pain as a heavy achy pain. Denies chest pain. Denies shortness of breath. Denies vomiting. No worsening or improving factors. Currently 4/10 severity. Patient was urgent care today for evaluation. They called EMS. Urgent care did give patient 324 aspirin prior to arrival.  Patient does not describe the sensation as a ripping or tearing sensation.            Past Medical History:   Diagnosis Date    Hypertension     Other ill-defined conditions(379.89)     elevated cholesterol       Past Surgical History:   Procedure Laterality Date    HX APPENDECTOMY      HX BREAST BIOPSY Right     HX CHOLECYSTECTOMY      HX GYN      hysterectomy         Family History:   Problem Relation Age of Onset    Breast Cancer Sister 62    Breast Cancer Sister 76       Social History     Socioeconomic History    Marital status: SINGLE     Spouse name: Not on file    Number of children: Not on file    Years of education: Not on file    Highest education level: Not on file   Occupational History    Not on file   Tobacco Use    Smoking status: Current Every Day Smoker     Packs/day: 1.50     Years: 37.00     Pack years: 55.50   Substance and Sexual Activity    Alcohol use: No    Drug use: No    Sexual activity: Not on file   Other Topics Concern    Not on file   Social History Narrative    Not on file     Social Determinants of Health     Financial Resource Strain:     Difficulty of Paying Living Expenses:    Food Insecurity:     Worried About Running Out of Food in the Last Year:     Shelia of Food in the Last Year: Transportation Needs:     Lack of Transportation (Medical):  Lack of Transportation (Non-Medical):    Physical Activity:     Days of Exercise per Week:     Minutes of Exercise per Session:    Stress:     Feeling of Stress :    Social Connections:     Frequency of Communication with Friends and Family:     Frequency of Social Gatherings with Friends and Family:     Attends Evangelical Services:     Active Member of Clubs or Organizations:     Attends Club or Organization Meetings:     Marital Status:    Intimate Partner Violence:     Fear of Current or Ex-Partner:     Emotionally Abused:     Physically Abused:     Sexually Abused: ALLERGIES: Codeine, Lortab [hydrocodone-acetaminophen], Pcn [penicillins], and Tylox [oxycodone-acetaminophen]    Review of Systems   Constitutional: Negative for chills and fever. HENT: Negative for sinus pressure and sore throat. Eyes: Negative for visual disturbance. Respiratory: Negative for cough and shortness of breath. Cardiovascular: Negative for chest pain. Gastrointestinal: Positive for nausea. Negative for abdominal pain, diarrhea and vomiting. Endocrine: Negative for polyuria. Genitourinary: Negative for difficulty urinating and dysuria. Musculoskeletal: Positive for back pain. Negative for neck pain and neck stiffness. Skin: Negative for rash. Neurological: Negative for weakness and headaches. Psychiatric/Behavioral: Negative for confusion. Vitals:    08/07/21 1454   BP: (!) 118/57   Pulse: 75   Resp: 18   Temp: 98.5 °F (36.9 °C)   SpO2: 94%   Weight: 93.9 kg (207 lb)   Height: 5' 4\" (1.626 m)            Physical Exam  Vitals and nursing note reviewed. Constitutional:       Appearance: Normal appearance. She is not ill-appearing or toxic-appearing. HENT:      Head: Normocephalic and atraumatic.       Nose: Nose normal.      Mouth/Throat:      Mouth: Mucous membranes are moist.   Eyes:      Extraocular Movements: Extraocular movements intact. Cardiovascular:      Rate and Rhythm: Normal rate and regular rhythm. Pulses: Normal pulses. Heart sounds: Normal heart sounds. Pulmonary:      Effort: Pulmonary effort is normal. No respiratory distress. Breath sounds: Normal breath sounds. Abdominal:      General: Abdomen is flat. There is no distension. Palpations: Abdomen is soft. Tenderness: There is no abdominal tenderness. Musculoskeletal:         General: Normal range of motion. Cervical back: Normal range of motion. No rigidity. Comments: Pulses are equal and present in the upper extremities. Skin:     General: Skin is warm and dry. Neurological:      General: No focal deficit present. Mental Status: She is alert and oriented to person, place, and time. Psychiatric:         Mood and Affect: Mood normal.          MDM  Number of Diagnoses or Management Options  Acute ST elevation myocardial infarction (STEMI), unspecified artery [facility])  Diagnosis management comments: 62-year-old female presents ER via EMS. Initial EKG did not meet STEMI criteria but with evidence of ischemia with concerning morphology most notably inferiorly. After obtaining quick history from patient, symptoms are concerning for possible ACS. Initially spoke with Dr. Dimitri Gamez, cardiology, and after discussion, he advised that I speak with the STEMI on-call physician, Dr. Luis Martin. After discussing the case with him, he advised to activate the Cath Lab and that he would take patient for definitive intervention. Concerning for ST elevation, greatest in the inferior leads. Patient had already received aspirin with EMS. Patient given weightbase bolus and drip of heparin. Basic labs were obtained. Disposition to cardiology for cardiac catheterization.     EKG from 1429: Sinus rhythm, rate 80, , QRS 82, QTc 493, normal axis, ST elevation inferiorly with T wave inversions, concerning for ST elevation MI Amount and/or Complexity of Data Reviewed  Clinical lab tests: ordered and reviewed  Discussion of test results with the performing providers: yes  Discuss the patient with other providers: yes  Independent visualization of images, tracings, or specimens: yes    Risk of Complications, Morbidity, and/or Mortality  Presenting problems: high  Diagnostic procedures: moderate  Management options: high           Critical Care  Performed by: Kasi Harley DO  Authorized by:  Kasi Harley DO     Critical care provider statement:     Critical care time (minutes):  35    Critical care time was exclusive of:  Separately billable procedures and treating other patients    Critical care was necessary to treat or prevent imminent or life-threatening deterioration of the following conditions:  Cardiac failure (STEMI)    Critical care was time spent personally by me on the following activities:  Development of treatment plan with patient or surrogate, discussions with consultants, examination of patient, obtaining history from patient or surrogate, ordering and performing treatments and interventions and ordering and review of laboratory studies

## 2021-08-08 ENCOUNTER — APPOINTMENT (OUTPATIENT)
Dept: NON INVASIVE DIAGNOSTICS | Age: 66
DRG: 246 | End: 2021-08-08
Attending: PHYSICIAN ASSISTANT
Payer: MEDICARE

## 2021-08-08 PROBLEM — Z72.0 TOBACCO ABUSE: Chronic | Status: ACTIVE | Noted: 2021-08-08

## 2021-08-08 PROBLEM — I10 HTN (HYPERTENSION): Chronic | Status: ACTIVE | Noted: 2021-08-08

## 2021-08-08 PROBLEM — E78.5 HLD (HYPERLIPIDEMIA): Chronic | Status: ACTIVE | Noted: 2021-08-08

## 2021-08-08 PROBLEM — E87.6 HYPOKALEMIA: Status: ACTIVE | Noted: 2021-08-08

## 2021-08-08 PROBLEM — F41.8 DEPRESSION WITH ANXIETY: Chronic | Status: ACTIVE | Noted: 2021-08-08

## 2021-08-08 PROBLEM — E11.9 DM (DIABETES MELLITUS) (HCC): Chronic | Status: ACTIVE | Noted: 2021-08-08

## 2021-08-08 LAB
ANION GAP SERPL CALC-SCNC: 4 MMOL/L (ref 7–16)
ATRIAL RATE: 80 BPM
ATRIAL RATE: 91 BPM
BASOPHILS # BLD: 0.1 K/UL (ref 0–0.2)
BASOPHILS NFR BLD: 1 % (ref 0–2)
BUN SERPL-MCNC: 14 MG/DL (ref 8–23)
CALCIUM SERPL-MCNC: 8.6 MG/DL (ref 8.3–10.4)
CALCULATED P AXIS, ECG09: -8 DEGREES
CALCULATED P AXIS, ECG09: 9 DEGREES
CALCULATED R AXIS, ECG10: -15 DEGREES
CALCULATED R AXIS, ECG10: 4 DEGREES
CALCULATED T AXIS, ECG11: 35 DEGREES
CALCULATED T AXIS, ECG11: 4 DEGREES
CHLORIDE SERPL-SCNC: 111 MMOL/L (ref 98–107)
CHOLEST SERPL-MCNC: 125 MG/DL
CO2 SERPL-SCNC: 27 MMOL/L (ref 21–32)
CREAT SERPL-MCNC: 0.65 MG/DL (ref 0.6–1)
DIAGNOSIS, 93000: NORMAL
DIAGNOSIS, 93000: NORMAL
DIFFERENTIAL METHOD BLD: ABNORMAL
ECHO AO ASC DIAM: 3.03 CM
ECHO AO ROOT DIAM: 3 CM
ECHO AV AREA PEAK VELOCITY: 1.92 CM2
ECHO AV AREA PEAK VELOCITY: 1.92 CM2
ECHO AV PEAK GRADIENT: 5.74 MMHG
ECHO AV PEAK VELOCITY: 119.8 CM/S
ECHO LA MAJOR AXIS: 3.64 CM
ECHO LA MINOR AXIS: 1.84 CM
ECHO LV E' LATERAL VELOCITY: 6.96 CM/S
ECHO LV E' SEPTAL VELOCITY: 4.55 CM/S
ECHO LV EDV A2C: 54.17 ML
ECHO LV EDV A4C: 50.61 ML
ECHO LV EDV BP: 55.39 ML (ref 56–104)
ECHO LV EDV INDEX A4C: 25.6 ML/M2
ECHO LV EDV INDEX BP: 28 ML/M2
ECHO LV EDV NDEX A2C: 27.4 ML/M2
ECHO LV EJECTION FRACTION A2C: 75 PERCENT
ECHO LV EJECTION FRACTION A4C: 45 PERCENT
ECHO LV EJECTION FRACTION BIPLANE: 68.2 PERCENT (ref 55–100)
ECHO LV ESV A2C: 13.53 ML
ECHO LV ESV A4C: 27.85 ML
ECHO LV ESV BP: 17.62 ML (ref 19–49)
ECHO LV ESV INDEX A2C: 6.8 ML/M2
ECHO LV ESV INDEX A4C: 14.1 ML/M2
ECHO LV ESV INDEX BP: 8.9 ML/M2
ECHO LV INTERNAL DIMENSION DIASTOLIC: 4.75 CM (ref 3.9–5.3)
ECHO LV INTERNAL DIMENSION SYSTOLIC: 3.82 CM
ECHO LV IVSD: 0.87 CM (ref 0.6–0.9)
ECHO LV MASS 2D: 155 G (ref 67–162)
ECHO LV MASS INDEX 2D: 78.3 G/M2 (ref 43–95)
ECHO LV POSTERIOR WALL DIASTOLIC: 1.02 CM (ref 0.6–0.9)
ECHO LVOT DIAM: 1.87 CM
ECHO LVOT PEAK GRADIENT: 2.78 MMHG
ECHO LVOT PEAK VELOCITY: 83.36 CM/S
ECHO MV A VELOCITY: 94.97 CM/S
ECHO MV AREA PHT: 3.98 CM2
ECHO MV E DECELERATION TIME (DT): 190.37 MS
ECHO MV E VELOCITY: 66.76 CM/S
ECHO MV E/A RATIO: 0.7
ECHO MV E/E' LATERAL: 9.59
ECHO MV E/E' RATIO (AVERAGED): 12.13
ECHO MV E/E' SEPTAL: 14.67
ECHO MV PRESSURE HALF TIME (PHT): 55.21 MS
ECHO RV INTERNAL DIMENSION: 2.94 CM
ECHO RV TAPSE: 1.65 CM (ref 1.5–2)
EOSINOPHIL # BLD: 0.1 K/UL (ref 0–0.8)
EOSINOPHIL NFR BLD: 1 % (ref 0.5–7.8)
ERYTHROCYTE [DISTWIDTH] IN BLOOD BY AUTOMATED COUNT: 14.4 % (ref 11.9–14.6)
GLUCOSE BLD STRIP.AUTO-MCNC: 116 MG/DL (ref 65–100)
GLUCOSE BLD STRIP.AUTO-MCNC: 132 MG/DL (ref 65–100)
GLUCOSE BLD STRIP.AUTO-MCNC: 90 MG/DL (ref 65–100)
GLUCOSE BLD STRIP.AUTO-MCNC: 97 MG/DL (ref 65–100)
GLUCOSE SERPL-MCNC: 106 MG/DL (ref 65–100)
HCT VFR BLD AUTO: 48.2 % (ref 35.8–46.3)
HDLC SERPL-MCNC: 29 MG/DL (ref 40–60)
HDLC SERPL: 4.3 {RATIO}
HGB BLD-MCNC: 15.7 G/DL (ref 11.7–15.4)
IMM GRANULOCYTES # BLD AUTO: 0.1 K/UL (ref 0–0.5)
IMM GRANULOCYTES NFR BLD AUTO: 1 % (ref 0–5)
LDLC SERPL CALC-MCNC: 41 MG/DL
LYMPHOCYTES # BLD: 2.8 K/UL (ref 0.5–4.6)
LYMPHOCYTES NFR BLD: 26 % (ref 13–44)
MCH RBC QN AUTO: 30 PG (ref 26.1–32.9)
MCHC RBC AUTO-ENTMCNC: 32.6 G/DL (ref 31.4–35)
MCV RBC AUTO: 92 FL (ref 79.6–97.8)
MONOCYTES # BLD: 0.8 K/UL (ref 0.1–1.3)
MONOCYTES NFR BLD: 8 % (ref 4–12)
NEUTS SEG # BLD: 6.6 K/UL (ref 1.7–8.2)
NEUTS SEG NFR BLD: 63 % (ref 43–78)
NRBC # BLD: 0 K/UL (ref 0–0.2)
P-R INTERVAL, ECG05: 152 MS
P-R INTERVAL, ECG05: 158 MS
PLATELET # BLD AUTO: 183 K/UL (ref 150–450)
PMV BLD AUTO: 10.1 FL (ref 9.4–12.3)
POTASSIUM SERPL-SCNC: 3.2 MMOL/L (ref 3.5–5.1)
Q-T INTERVAL, ECG07: 344 MS
Q-T INTERVAL, ECG07: 428 MS
QRS DURATION, ECG06: 80 MS
QRS DURATION, ECG06: 82 MS
QTC CALCULATION (BEZET), ECG08: 423 MS
QTC CALCULATION (BEZET), ECG08: 493 MS
RBC # BLD AUTO: 5.24 M/UL (ref 4.05–5.2)
SERVICE CMNT-IMP: ABNORMAL
SERVICE CMNT-IMP: ABNORMAL
SERVICE CMNT-IMP: NORMAL
SERVICE CMNT-IMP: NORMAL
SODIUM SERPL-SCNC: 142 MMOL/L (ref 136–145)
TRIGL SERPL-MCNC: 275 MG/DL (ref 35–150)
TROPONIN-HIGH SENSITIVITY: ABNORMAL PG/ML (ref 0–14)
VENTRICULAR RATE, ECG03: 80 BPM
VENTRICULAR RATE, ECG03: 91 BPM
VLDLC SERPL CALC-MCNC: 55 MG/DL (ref 6–23)
WBC # BLD AUTO: 10.4 K/UL (ref 4.3–11.1)

## 2021-08-08 PROCEDURE — 36415 COLL VENOUS BLD VENIPUNCTURE: CPT

## 2021-08-08 PROCEDURE — 74011250637 HC RX REV CODE- 250/637: Performed by: INTERNAL MEDICINE

## 2021-08-08 PROCEDURE — 80061 LIPID PANEL: CPT

## 2021-08-08 PROCEDURE — 2709999900 HC NON-CHARGEABLE SUPPLY

## 2021-08-08 PROCEDURE — 74011250636 HC RX REV CODE- 250/636: Performed by: INTERNAL MEDICINE

## 2021-08-08 PROCEDURE — 65660000000 HC RM CCU STEPDOWN

## 2021-08-08 PROCEDURE — C8929 TTE W OR WO FOL WCON,DOPPLER: HCPCS

## 2021-08-08 PROCEDURE — 77030040361 HC SLV COMPR DVT MDII -B

## 2021-08-08 PROCEDURE — 99232 SBSQ HOSP IP/OBS MODERATE 35: CPT | Performed by: INTERNAL MEDICINE

## 2021-08-08 PROCEDURE — 74011000250 HC RX REV CODE- 250: Performed by: INTERNAL MEDICINE

## 2021-08-08 PROCEDURE — 74011250637 HC RX REV CODE- 250/637: Performed by: NURSE PRACTITIONER

## 2021-08-08 PROCEDURE — 74011250637 HC RX REV CODE- 250/637: Performed by: PHYSICIAN ASSISTANT

## 2021-08-08 PROCEDURE — 85025 COMPLETE CBC W/AUTO DIFF WBC: CPT

## 2021-08-08 PROCEDURE — 77010033678 HC OXYGEN DAILY

## 2021-08-08 PROCEDURE — 93005 ELECTROCARDIOGRAM TRACING: CPT | Performed by: INTERNAL MEDICINE

## 2021-08-08 PROCEDURE — 80048 BASIC METABOLIC PNL TOTAL CA: CPT

## 2021-08-08 PROCEDURE — 84484 ASSAY OF TROPONIN QUANT: CPT

## 2021-08-08 PROCEDURE — 82962 GLUCOSE BLOOD TEST: CPT

## 2021-08-08 RX ADMIN — TICAGRELOR 90 MG: 90 TABLET ORAL at 18:07

## 2021-08-08 RX ADMIN — ALPRAZOLAM 2 MG: 0.5 TABLET ORAL at 22:05

## 2021-08-08 RX ADMIN — POTASSIUM BICARBONATE 40 MEQ: 782 TABLET, EFFERVESCENT ORAL at 18:07

## 2021-08-08 RX ADMIN — TICAGRELOR 90 MG: 90 TABLET ORAL at 06:25

## 2021-08-08 RX ADMIN — Medication 10 ML: at 06:27

## 2021-08-08 RX ADMIN — GLIPIZIDE 5 MG: 5 TABLET ORAL at 06:25

## 2021-08-08 RX ADMIN — Medication 5 ML: at 22:00

## 2021-08-08 RX ADMIN — TRAZODONE HYDROCHLORIDE 50 MG: 50 TABLET ORAL at 21:58

## 2021-08-08 RX ADMIN — POTASSIUM BICARBONATE 40 MEQ: 782 TABLET, EFFERVESCENT ORAL at 08:01

## 2021-08-08 RX ADMIN — Medication 5 ML: at 22:12

## 2021-08-08 RX ADMIN — METOPROLOL TARTRATE 12.5 MG: 25 TABLET, FILM COATED ORAL at 18:07

## 2021-08-08 RX ADMIN — Medication 10 ML: at 18:07

## 2021-08-08 RX ADMIN — METOPROLOL TARTRATE 12.5 MG: 25 TABLET, FILM COATED ORAL at 06:25

## 2021-08-08 RX ADMIN — ASPIRIN 81 MG: 81 TABLET, CHEWABLE ORAL at 08:01

## 2021-08-08 RX ADMIN — LOSARTAN POTASSIUM 25 MG: 25 TABLET, FILM COATED ORAL at 08:02

## 2021-08-08 RX ADMIN — PERFLUTREN 1 ML: 6.52 INJECTION, SUSPENSION INTRAVENOUS at 10:45

## 2021-08-08 RX ADMIN — PRAVASTATIN SODIUM 80 MG: 20 TABLET ORAL at 21:58

## 2021-08-08 RX ADMIN — Medication 5 ML: at 18:08

## 2021-08-08 RX ADMIN — DULOXETINE HYDROCHLORIDE 60 MG: 60 CAPSULE, DELAYED RELEASE ORAL at 08:02

## 2021-08-08 NOTE — PROGRESS NOTES
[facility] CARDIOLOGY PROGRESS NOTE           8/8/2021 9:22 AM    Admit Date: 8/7/2021         Subjective: Doing well, denies CP or SOB. ROS:  Cardiovascular:  As noted above    Objective:      Vitals:    08/08/21 0816 08/08/21 0831 08/08/21 0847 08/08/21 0901   BP: (!) 102/54 (!) 118/55 105/61 116/64   Pulse: 83 82 86 86   Resp: 13 21 20 18   Temp:       SpO2: 93% 95% 96% 94%   Weight:       Height:           On telemetry: sr      Physical Exam:  General: Well Developed, Well Nourished, No Acute Distress, Alert & Oriented x 3, Appropriate mood  Neck: supple, no JVD  Heart: S1S2 with RRR without murmurs or gallops  Lungs: Clear throughout auscultation bilaterally without adventitious sounds  Abd: soft, nontender, nondistended, with good bowel sounds  Ext: no edema bilaterally  Skin: warm and dry      Data Review:   Recent Labs     08/08/21  0426 08/07/21  1450    140   K 3.2* 3.9   BUN 14 13   CREA 0.65 0.71   * 99   WBC 10.4 14.8*   HGB 15.7* 17.3*   HCT 48.2* 53.4*    207   CHOL 125  --    LDLC 41  --    HDL 29*  --        No results for input(s): TNIPOC, TROIQ in the last 72 hours.         Assessment/Plan:     Active Problems:    STEMI (ST elevation myocardial infarction) ([facility] [address] (8/7/2021)    A/P  1) NSTEMI/CAD - DAPT statin  2) sCHF - BB/ARB  3) DM - home meds  4) Lipids - statin    xfer to floor today, echo pending today      Ani Munoz MD  8/8/2021 9:22 AM

## 2021-08-08 NOTE — PROGRESS NOTES
Pt states she is diaphoretic and not sure what is wrong. O2 placed on pt. Pt sat up on side of bed. States she feels better, but now is tearful. Up to commode to void and possible bowel movement.

## 2021-08-08 NOTE — PROGRESS NOTES
TRANSFER - IN REPORT:    Verbal report received from [place], [facility] on [address] being received from ICU for routine progression of care. Report consisted of patients Situation, Background, Assessment and Recommendations(SBAR). Information from the following report(s) SBAR, Kardex, Procedure Summary, Intake/Output, MAR, Accordion, Recent Results and Cardiac Rhythm NSR was reviewed. Opportunity for questions and clarification was provided. Assessment completed upon patients arrival to unit and care assumed. Patient received to room 311. Patient connected to monitor and assessment completed. Plan of care reviewed. Patient oriented to room and call light. Patient aware to use call light to communicate any chest pain or needs. Admission skin assessment completed with second RN and reveals the following:   Sacrum/Coccyx and heels intact and absent of redness and swelling. Incision noted on R wrist and R groin s/p HC, covered with tegaderm and absent of bleeding or hematoma. No wounds, bruising, scars, or other lesions noted. Tattoo noted on back of neck. Pt states \"My knees look mottled. \" Upon assessment, no swelling or mottling noted and pt reports no pain upon palpation.

## 2021-08-08 NOTE — PROGRESS NOTES
Bedside report received from [facility]. Pt easily arouses to name. Right groin site soft to touch, no bleeding or swelling at site. Right radial site clean and dry. Pt is alert and oriented X 4.

## 2021-08-08 NOTE — PROGRESS NOTES
TRANSFER - OUT REPORT:    Verbal report given to Anali RN(name) on Marga HONEYCUTT Pack  being transferred to [facility](unit) for routine progression of care       Report consisted of patients Situation, Background, Assessment and   Recommendations(SBAR). Information from the following report(s) Kardex, Procedure Summary, Intake/Output, MAR, Recent Results and Cardiac Rhythm Sinus rhythm was reviewed with the receiving nurse. Lines:   Peripheral IV 08/07/21 Left;Posterior Hand (Active)   Site Assessment Clean, dry, & intact 08/08/21 0702   Phlebitis Assessment 0 08/08/21 0702   Infiltration Assessment 0 08/08/21 0702   Dressing Status Clean, dry, & intact 08/08/21 0702   Dressing Type Transparent 08/08/21 0702   Hub Color/Line Status Pink;Capped 08/08/21 0816   Alcohol Cap Used No 08/07/21 1829        Opportunity for questions and clarification was provided.       Patient transported with:   Monitor  Registered Nurse

## 2021-08-08 NOTE — PROGRESS NOTES
Problem: Pressure Injury - Risk of  Goal: *Prevention of pressure injury  Description: Document Benito Scale and appropriate interventions in the flowsheet. Outcome: Progressing Towards Goal  Note: Pressure Injury Interventions:  Sensory Interventions: Maintain/enhance activity level, Keep linens dry and wrinkle-free, Minimize linen layers, Monitor skin under medical devices    Moisture Interventions: Minimize layers, Maintain skin hydration (lotion/cream)    Activity Interventions: Pressure redistribution bed/mattress(bed type), Increase time out of bed    Mobility Interventions: Pressure redistribution bed/mattress (bed type), HOB 30 degrees or less    Nutrition Interventions: Document food/fluid/supplement intake    Friction and Shear Interventions: HOB 30 degrees or less, Minimize layers                Problem: Patient Education: Go to Patient Education Activity  Goal: Patient/Family Education  Outcome: Progressing Towards Goal     Problem: Falls - Risk of  Goal: *Absence of Falls  Description: Document Darrel Fall Risk and appropriate interventions in the flowsheet.   Outcome: Progressing Towards Goal  Note: Fall Risk Interventions:  Mobility Interventions: Communicate number of staff needed for ambulation/transfer, Patient to call before getting OOB         Medication Interventions: Patient to call before getting OOB, Teach patient to arise slowly, Evaluate medications/consider consulting pharmacy    Elimination Interventions: Call light in reach, Patient to call for help with toileting needs, Toilet paper/wipes in reach              Problem: Patient Education: Go to Patient Education Activity  Goal: Patient/Family Education  Outcome: Progressing Towards Goal

## 2021-08-09 VITALS
DIASTOLIC BLOOD PRESSURE: 53 MMHG | HEART RATE: 89 BPM | SYSTOLIC BLOOD PRESSURE: 117 MMHG | RESPIRATION RATE: 17 BRPM | TEMPERATURE: 99.1 F | WEIGHT: 208 LBS | OXYGEN SATURATION: 96 % | BODY MASS INDEX: 35.51 KG/M2 | HEIGHT: 64 IN

## 2021-08-09 PROBLEM — I21.4 NSTEMI (NON-ST ELEVATED MYOCARDIAL INFARCTION) (HCC): Status: ACTIVE | Noted: 2021-08-09

## 2021-08-09 LAB
ANION GAP SERPL CALC-SCNC: 5 MMOL/L (ref 7–16)
BUN SERPL-MCNC: 16 MG/DL (ref 8–23)
CALCIUM SERPL-MCNC: 8.6 MG/DL (ref 8.3–10.4)
CHLORIDE SERPL-SCNC: 111 MMOL/L (ref 98–107)
CO2 SERPL-SCNC: 26 MMOL/L (ref 21–32)
CREAT SERPL-MCNC: 0.61 MG/DL (ref 0.6–1)
ERYTHROCYTE [DISTWIDTH] IN BLOOD BY AUTOMATED COUNT: 14.2 % (ref 11.9–14.6)
GLUCOSE BLD STRIP.AUTO-MCNC: 109 MG/DL (ref 65–100)
GLUCOSE SERPL-MCNC: 108 MG/DL (ref 65–100)
HCT VFR BLD AUTO: 47.6 % (ref 35.8–46.3)
HGB BLD-MCNC: 15.6 G/DL (ref 11.7–15.4)
MCH RBC QN AUTO: 29.4 PG (ref 26.1–32.9)
MCHC RBC AUTO-ENTMCNC: 32.8 G/DL (ref 31.4–35)
MCV RBC AUTO: 89.8 FL (ref 79.6–97.8)
NRBC # BLD: 0 K/UL (ref 0–0.2)
PLATELET # BLD AUTO: 181 K/UL (ref 150–450)
PMV BLD AUTO: 9.8 FL (ref 9.4–12.3)
POTASSIUM SERPL-SCNC: 3.6 MMOL/L (ref 3.5–5.1)
RBC # BLD AUTO: 5.3 M/UL (ref 4.05–5.2)
SERVICE CMNT-IMP: ABNORMAL
SODIUM SERPL-SCNC: 142 MMOL/L (ref 136–145)
WBC # BLD AUTO: 8.5 K/UL (ref 4.3–11.1)

## 2021-08-09 PROCEDURE — 85027 COMPLETE CBC AUTOMATED: CPT

## 2021-08-09 PROCEDURE — 36415 COLL VENOUS BLD VENIPUNCTURE: CPT

## 2021-08-09 PROCEDURE — 74011250637 HC RX REV CODE- 250/637: Performed by: PHYSICIAN ASSISTANT

## 2021-08-09 PROCEDURE — 82962 GLUCOSE BLOOD TEST: CPT

## 2021-08-09 PROCEDURE — 80048 BASIC METABOLIC PNL TOTAL CA: CPT

## 2021-08-09 PROCEDURE — 74011250637 HC RX REV CODE- 250/637: Performed by: INTERNAL MEDICINE

## 2021-08-09 RX ORDER — NITROGLYCERIN 0.4 MG/1
0.4 TABLET SUBLINGUAL
Qty: 25 TABLET | Refills: 3 | Status: SHIPPED | OUTPATIENT
Start: 2021-08-09

## 2021-08-09 RX ORDER — METOPROLOL SUCCINATE 25 MG/1
25 TABLET, EXTENDED RELEASE ORAL DAILY
Qty: 30 TABLET | Refills: 3 | Status: SHIPPED | OUTPATIENT
Start: 2021-08-09 | End: 2021-09-08 | Stop reason: SDUPTHER

## 2021-08-09 RX ORDER — GUAIFENESIN 100 MG/5ML
81 LIQUID (ML) ORAL DAILY
Status: SHIPPED | COMMUNITY
Start: 2021-08-09

## 2021-08-09 RX ADMIN — Medication 10 ML: at 06:00

## 2021-08-09 RX ADMIN — METOPROLOL TARTRATE 12.5 MG: 25 TABLET, FILM COATED ORAL at 08:35

## 2021-08-09 RX ADMIN — TICAGRELOR 90 MG: 90 TABLET ORAL at 08:35

## 2021-08-09 RX ADMIN — ASPIRIN 81 MG: 81 TABLET, CHEWABLE ORAL at 08:35

## 2021-08-09 RX ADMIN — DULOXETINE HYDROCHLORIDE 60 MG: 60 CAPSULE, DELAYED RELEASE ORAL at 08:35

## 2021-08-09 RX ADMIN — LOSARTAN POTASSIUM 25 MG: 25 TABLET, FILM COATED ORAL at 08:36

## 2021-08-09 RX ADMIN — GLIPIZIDE 5 MG: 5 TABLET ORAL at 08:35

## 2021-08-09 NOTE — PROGRESS NOTES
Physician Progress Note      [place]  CSN #:                  [id number]  :                       1955  ADMIT DATE:       2021 2:45 PM  [address] [place] DATE:  RESPONDING  PROVIDER #:        Flor Salinas MD          QUERY TEXT:    Pt admitted with NSTEMI/CAD and has CHF documented per PN . If possible, please document in progress notes and discharge summary further specificity regarding the type and acuity of CHF:    The medical record reflects the following:  Risk Factors: NSTEMI, CAD,sCHF  Clinical Indicators: PN -Active Problem List notes: A/P- 1) NSTEMI/CAD 2) sCHF ; Echo- EF 40-45%; LHC- Late presented inferior AMI s/p PCI to prox RCA with SINA. LVEF 40-45% with inferior HK  Treatment: Echo, LHC, Per PN - BB/ARB  Options provided:  -- Acute on Chronic Systolic CHF/HFrEF  -- Acute on Chronic Diastolic CHF/HFpEF  -- Acute on Chronic Systolic and Diastolic CHF  -- Acute Systolic CHF/HFrEF  -- Acute Diastolic CHF/HFpEF  -- Acute Systolic and Diastolic CHF  -- Chronic Systolic CHF/HFrEF  -- Chronic Diastolic CHF/HFpEF  -- Chronic Systolic and Diastolic CHF  -- Other - I will add my own diagnosis  -- Disagree - Not applicable / Not valid  -- Disagree - Clinically unable to determine / Unknown  -- Refer to Clinical Documentation Reviewer    PROVIDER RESPONSE TEXT:    This patient is in acute systolic CHF/HFrEF.     Query created by: devon sosa on 2021 10:06 AM      Electronically signed by:  Flor Salinas MD 2021 10:11 AM

## 2021-08-09 NOTE — ROUTINE PROCESS
Cardiac Rehab: Spoke with patient regarding referral to cardiac rehab. Patient meets admission criteria based on NSTEMI with PCI (8/7/21). Written information about Cardiac Rehab given and reviewed with patient. Discussed lifestyle modifications to promote cardiac wellness. Patient indicated that she does not want to participate in the cardiac rehab program at this time. She was encouraged to tour our program when in for her follow up appt. Her Cardiologist is Dr. Noe Fernandez.       Thank you,  ELIUD FernandezN, RN  Cardiopulmonary Rehabilitation Nurse Liaison  [facility] Programs Detail Level: Generalized Include Location In Plan?: No

## 2021-08-09 NOTE — PROGRESS NOTES
Pt arrived c/o upper BP that radiated to her neck and shoulder with associated SOB and diaphoresis. ECG showed mild ST elevation in inferior leads and STEMI protocol was called. Pt is now discharging home in stable condition. No d/c needs were identified. Tx goals have been met. Care Management Interventions  PCP Verified by CM: Yes Karley Mello)  Mode of Transport at Discharge:  Other (see comment) (Family)  Transition of Care Consult (CM Consult): Discharge Planning  Discharge Durable Medical Equipment: No  Physical Therapy Consult: No  Occupational Therapy Consult: No  Speech Therapy Consult: No  Current Support Network: Family Lives [place], Own Home  Confirm Follow Up Transport: Family  The Plan for Transition of Care is Related to the Following Treatment Goals : Return home and back to her baseline  The Patient and/or Patient Representative was Provided with a Choice of Provider and Agrees with the Discharge Plan?: Yes  Name of the Patient Representative Who was Provided with a Choice of Provider and Agrees with the Discharge Plan: Patient  Freedom of Choice List was Provided with Basic Dialogue that Supports the Patient's Individualized Plan of Care/Goals, Treatment Preferences and Shares the Quality Data Associated with the Providers?: Yes  [place] Resource Information Provided?: No  Discharge Location  Discharge Placement: Home

## 2021-08-09 NOTE — DISCHARGE INSTRUCTIONS
Patient Education        Heart Attack: Care Instructions  Overview     A heart attack is an event that occurs when part of the heart muscle does not get enough blood and oxygen. This part of the heart starts to die. A heart attack is also called a myocardial infarction, or MI. A heart attack most often happens because blood flow through one or more of the coronary arteries is blocked. This blockage is usually caused by a blood clot that forms when plaque in the artery breaks open. After a heart attack, you may be worried about your future. Over the next several weeks, your heart will start to heal. Though it can be hard to break old habits, you can reduce your risk of having another heart attack. You can do this by making some lifestyle changes and by taking medicines. Follow-up care is a key part of your treatment and safety. Be sure to make and go to all appointments, and call your doctor if you are having problems. It's also a good idea to know your test results and keep a list of the medicines you take. How can you care for yourself at home? Activity    · Until your doctor says it is okay, do not do strenuous exercise. And do not lift, pull, or push anything heavy. Ask your doctor what types of activities are safe for you.     · If your doctor has not set you up with a cardiac rehabilitation (rehab) program, talk to him or her about whether that is right for you. Cardiac rehab includes supervised exercise. It also includes help with diet and lifestyle changes and emotional support. It may reduce your risk of future heart problems.     · Increase your activities slowly. Take short rest breaks when you get tired.     · If your doctor recommends it, get more exercise. Walking is a good choice. Bit by bit, increase the amount you walk every day. Try for at least 30 minutes on most days of the week. You also may want to swim, bike, or do other activities.  Talk with your doctor before you start an exercise program to make sure it is safe for you.     · Ask your doctor when you can drive, go back to work, and do other daily activities again.     · You can have sex as soon as you feel ready for it. Often this means when you can easily walk around or climb stairs. Talk with your doctor if you have any concerns. If you are taking nitroglycerin, do not take erection-enhancing medicine such as sildenafil (Viagra), tadalafil (Cialis), or vardenafil (Levitra) . Lifestyle changes    · Do not smoke. Smoking increases your risk of another heart attack. If you need help quitting, talk to your doctor about stop-smoking programs and medicines. These can increase your chances of quitting for good.     · Eat a heart-healthy diet that is low in saturated fat and salt, and is full of fruits, vegetables and whole-grains. You may get more details about how to eat healthy. But these tips can help you get started.     · Stay at a healthy weight, or lose weight if you need to. Medicines    · Be safe with medicines. Take your medicines exactly as prescribed. Call your doctor if you think you are having a problem with your medicine. You will get more details on the specific medicines your doctor prescribes. Do not stop taking your medicine unless your doctor tells you to. Not taking your medicine might raise your risk of having another heart attack.     · You may need several medicines to help lower your risk of another heart attack. These include:  ? Blood pressure medicines such as angiotensin-converting enzyme (ACE) inhibitors, ARBs (angiotensin II receptor blockers), and beta-blockers. ? Cholesterol medicine called statins. ? Aspirin and other blood thinners. These prevent blood clots that can cause a heart attack.     · If your doctor has given you nitroglycerin, keep it with you at all times. If you have angina symptoms, such as chest pain or pressure, sit down and rest. Take the first dose of nitroglycerin as directed.  If symptoms get worse or are not getting better within 5 minutes, call 911 right away. Stay on the phone. The emergency  will tell you what to do.     · Do not take any over-the-counter medicines, vitamins, or herbal products without talking to your doctor first.   Staying healthy    · Manage other health conditions such as high blood pressure and diabetes.     · Avoid colds and flu. Get a pneumococcal vaccine shot. If you have had one before, ask your doctor whether you need another dose. Get the flu vaccine every year. If you must be around people with colds or flu, wash your hands often.     · Be sure to tell your doctor about any angina symptoms you have had, even if they went away. Pay attention to your angina symptoms. Know what is typical for you and learn how to control it. Know when to call for help.     · Talk to your family, friends, or a counselor about your feelings. It is normal to feel frightened, angry, hopeless, helpless, and even guilty. Talking openly about bad feelings can help you cope. If you have symptoms of depression, talk to your doctor. When should you call for help? Call 911 anytime you think you may need emergency care. For example, call if:    · You have symptoms of a heart attack. These may include:  ? Chest pain or pressure, or a strange feeling in the chest.  ? Sweating. ? Shortness of breath. ? Nausea or vomiting. ? Pain, pressure, or a strange feeling in the back, neck, jaw, or upper belly or in one or both shoulders or arms. ? Lightheadedness or sudden weakness. ? A fast or irregular heartbeat. After you call 911, the  may tell you to chew 1 adult-strength or 2 to 4 low-dose aspirin. Wait for an ambulance. Do not try to drive yourself.     · You have angina symptoms (such as chest pain or pressure) that do not go away with rest or are not getting better within 5 minutes after you take a dose of nitroglycerin.     · You passed out (lost consciousness).      · You feel like you are having another heart attack. Call your doctor now or seek immediate medical care if:    · You are having angina symptoms, such as chest pain or pressure, more often than usual, or the symptoms are different or worse than usual.     · You have new or increased shortness of breath.     · You are dizzy or lightheaded, or you feel like you may faint. Watch closely for changes in your health, and be sure to contact your doctor if you have any problems. Where can you learn more? Go to http://www.gray.com/  Enter H564 in the search box to learn more about \"Heart Attack: Care Instructions. \"  Current as of: August 31, 2020               Content Version: 12.8  © 2006-2021 [employer]. Care instructions adapted under license by [employer] (which disclaims liability or warranty for this information). If you have questions about a medical condition or this instruction, always ask your healthcare professional. [place] [address] any warranty or liability for your use of this information. Percutaneous Coronary Intervention: What to Expect at [facility]  Percutaneous coronary intervention (PCI) is the name for procedures that are used to open a blocked coronary artery. The two most common PCI procedures are coronary angioplasty and coronary stent placement. Your groin or arm may have a bruise and feel sore for a day or two after a percutaneous coronary intervention (PCI). You can do light activities around the house, but nothing strenuous for several days. This care sheet gives you a general idea about how long it will take for you to recover. But each person recovers at a different pace. Follow the steps below to get better as quickly as possible. How can you care for yourself at home? Activity  · Do not do strenuous exercise and do not lift anything heavy until your doctor says it is okay.  You can walk around the house and do light activity, such as cooking. · For 7-10 days after you go home, do not take baths. Showers are okay. · Try not to walk up stairs for the first couple of days (if the catheter was placed in the artery in your groin). · Go back to regular exercise after seen by cardiologist. Exercise is good for your heart. Get at least 30 minutes of physical activity on most days of the week. Walking is a good choice. If your doctor says it is okay, you also may want to do other activities, such as running, swimming, cycling, or playing tennis. Diet  · Drink plenty of fluids to help your body flush out the dye. If you have kidney, heart, or liver disease and have to limit fluids, talk with your doctor before you increase the amount of fluids you drink. · Keep eating a heart-healthy, low-fat diet that has lots of fruits, vegetables, and whole grains. If you have not been eating this way, talk to your doctor. You also may want to talk to a dietitian. This expert can help you to learn about healthy foods and plan meals. Medicines  · Your doctor may have prescribed a blood-thinning medicine like aspirin and/or Plavix. It is very important that you take these medicines daily exactly as directed in order to keep the coronary artery open and reduce your risk of a heart attack. · Call your doctor if you think you are having a problem with your medicine. Care of the catheter site  · For 1 or 2 days, you may keep a bandage over the spot where the catheter was inserted if needed. Most often, the bandage may be removed at discharge. · Put ice or a cold pack on the area for 10 to 15 minutes at a time to help with soreness or swelling. Put a thin cloth between the ice and your skin. Follow-up care is a key part of your treatment and safety. Be sure to make and go to all appointments, and call your doctor if you are having problems. Its also a good idea to know your test results.   Keep an updated list of your medicines to show all medical providers. When should you call for help? Call 911 anytime you think you may need emergency care. For example, call if:  · You pass out (lose consciousness). · You have severe trouble breathing. · You have sudden chest pain and shortness of breath, or you cough up blood. · You have chest pain or pressure. This may occur with:  ¨ Sweating. ¨ Shortness of breath. ¨ Nausea or vomiting. ¨ Pain that spreads from the chest to the neck, jaw, or one or both shoulders or arms. ¨ Dizziness or lightheadedness. ¨ A fast or uneven pulse. After calling 911, chew 1 adult aspirin. Wait for an ambulance. Do not try to drive yourself. · You have been diagnosed with angina, and you have chest pain that does not go away with rest or is not getting better within 5 minutes after you take a dose of nitroglycerin. Call your doctor now or seek immediate medical care if:  · You are bleeding from the area where the catheter was put in your artery. · You have signs of infection, such as:  ¨ Increased pain, swelling, warmth, or redness. ¨ Red streaks leading from the catheter site. ¨ Pus draining from the catheter site. ¨ Swollen lymph nodes in your neck, armpits, or groin. ¨ A fever. · Your leg or arm looks blue or feels cold, numb, or tingly. Watch closely for changes in your health, and be sure to contact your doctor if you have any problems. Where can you learn more? Go to [employer]  Enter S404 in the search box to learn more about \"Percutaneous Coronary Intervention: What to Expect at Home\". This care instruction is for use with your licensed healthcare professional. If you have questions about a medical condition or this instruction, always ask your healthcare professional. Care instructions adapted by [employer] (which disclaims liability or warranty for this information) from Genoveva Aschoff, [address] [facility] 2008.  [employer] disclaims any warranty or liability for your use of this information. DISPOSITION: The patient is being discharged home in stable condition on a low saturated fat, low cholesterol and low salt diet. The patient is instructed to advance activities as tolerated to the limit of fatigue or shortness of breath. The patient is instructed to avoid all heavy lifting, straining, stooping or squatting for 3-5 days. The patient is instructed to watch the cath site for bleeding/oozing; if seen, the patient is instructed to apply firm pressure with a clean cloth and call [facility] at [phone]. The patient is instructed to watch for signs of infection which include: increasing area of redness, fever/hot to touch or purulent drainage at the catheterization site. The patient is instructed not to soak in a bathtub for 7-10 days, but is cleared to shower. The patient is instructed to call the office or return to the ER for immediate evaluation for any shortness of breath or chest pain not relieved by NTG. Cardiac Catheterization/Angiography Discharge Instructions    *Check the puncture site frequently for swelling or bleeding. If you see any bleeding, lie down and apply pressure over the area with a clean town or washcloth. Notify your doctor for any redness, swelling, drainage or oozing from the puncture site. Notify your doctor for any fever or chills. *If the leg or arm with the puncture becomes cold, numb or painful, call [facility] at [phone]    *Activity should be limited for the next 48 hours. Climb stairs as little as possible and avoid any stooping, bending or strenuous activity for 48 hours. No heavy lifting (anything over 10 pounds) for three days. *Do not drive for 48 hours. *You may resume your usual diet. Drink more fluids than usual.    *Have a responsible person drive you home and stay with you for at least 24 hours after your heart catheterization/angiography.     *You may remove the bandage from your Right and Groin in 24 hours. You may shower in 24 hours. No tub baths, hot tubs or swimming for one week. Do not place any lotions, creams, powders, ointments over the puncture site for one week. You may place a clean band-aid over the puncture site each day for 5 days. Change this daily.

## 2021-08-09 NOTE — PROGRESS NOTES
[facility] CRITICAL CARE OUTREACH NURSE PROGRESS REPORT      SUBJECTIVE: Called to assess patient secondary to ICU outreach protocol. MEWS Score: 1 (08/09/21 0457)  Vitals:    08/08/21 2018 08/09/21 0007 08/09/21 0457 08/09/21 0459   BP: 121/76 123/68 139/80    Pulse: 79 87 86    Resp: 16 17 18    Temp: 98.2 °F (36.8 °C) 98.3 °F (36.8 °C) 98.1 °F (36.7 °C)    SpO2: 98% 94% 96%    Weight:    94.3 kg (208 lb)   Height:          EKG: normal EKG, normal sinus rhythm, unchanged from previous tracings. LAB DATA:    Recent Labs     08/08/21 0426 08/07/21  1450    140   K 3.2* 3.9   * 108*   CO2 27 21   AGAP 4* 11   * 99   BUN 14 13   CREA 0.65 0.71   GFRAA >60 >60   GFRNA >60 >60   CA 8.6 8.8   ALB  --  3.3   TP  --  6.5   GLOB  --  3.2   AGRAT  --  1.0*   ALT  --  158*        Recent Labs     08/08/21 0426 08/07/21  1450   WBC 10.4 14.8*   HGB 15.7* 17.3*   HCT 48.2* 53.4*    207          OBJECTIVE: On arrival to room, I found patient to be resting quietly in bed. Pain Assessment  Pain Intensity 1: 0 (08/09/21 0457)  Pain Location 1: Groin  Pain Intervention(s) 1: Emotional support, Repositioned, Therapeutic touch  Patient Stated Pain Goal: 0                                 ASSESSMENT:  Pt has stable vital signs. Insertion sites are clean. Breathing in unlabored and chest rise is symmetrical.     PLAN:  Continue to monitor per ICU outreach protocol. Support primary nurse.

## 2021-08-09 NOTE — PROGRESS NOTES
[facility] CRITICAL CARE OUTREACH NURSE PROGRESS REPORT      SUBJECTIVE: Called to assess patient secondary to ICU outreach protocol. MEWS Score: 1 (08/08/21 2018)  Vitals:    08/08/21 1115 08/08/21 1206 08/08/21 1619 08/08/21 2018   BP: 112/66 123/72 121/77 121/76   Pulse: 83 84 82 79   Resp: 14 16 14 16   Temp:  98.1 °F (36.7 °C) 99 °F (37.2 °C) 98.2 °F (36.8 °C)   SpO2: 97% 98% 97% 98%   Weight:       Height:          EKG: normal EKG, normal sinus rhythm, unchanged from previous tracings. LAB DATA:    Recent Labs     08/08/21 0426 08/07/21  1450    140   K 3.2* 3.9   * 108*   CO2 27 21   AGAP 4* 11   * 99   BUN 14 13   CREA 0.65 0.71   GFRAA >60 >60   GFRNA >60 >60   CA 8.6 8.8   ALB  --  3.3   TP  --  6.5   GLOB  --  3.2   AGRAT  --  1.0*   ALT  --  158*        Recent Labs     08/08/21  0426 08/07/21  1450   WBC 10.4 14.8*   HGB 15.7* 17.3*   HCT 48.2* 53.4*    207          OBJECTIVE: On arrival to room, I found patient to be in bed needing water and a new pillow case but very thankful. Pain Assessment  Pain Intensity 1: 0 (08/08/21 1619)  Pain Location 1: Groin  Pain Intervention(s) 1: Emotional support, Repositioned, Therapeutic touch  Patient Stated Pain Goal: 0                                 ASSESSMENT:  Pt has stable vital signs. Room air with oxygen saturation 98%. Echo was done at bedside today. EF was 40-45%. R groin and R radial artery incertion sites were clean. PLAN:  Continue to monitor and support primary nurse.

## 2021-08-09 NOTE — DISCHARGE SUMMARY
[facility] Cardiology Discharge Summary     Patient ID:  Shyann Vang  [id number]  73 y.o.  1955    Admit date: 8/7/2021    Discharge date:  8/9/2021    Admitting Physician: Ivana Waters MD     Discharge Physician: Dr. Gustavo Blancas    Admission Diagnoses: NSTEMI- delayed presentation MI    Discharge Diagnoses:    Diagnosis    HTN (hypertension)    HLD (hyperlipidemia)    DM (diabetes mellitus)     Tobacco abuse    Depression with anxiety    Hypokalemia    NSTEMI- delayed presentation MI       Cardiology Procedures this admission:  Left heart catheterization with PCI  EchoCardiogram  Consults: None    Hospital Course: Patient presented to the ED with c/o 2 days of CP and EKG showed ST elevation in the inferior leads. She was taken for an emergent LHC and underwent cardiac catheterization by Dr. Meliza Lombardi. Patient was found to have a 100% occlusion of the pRCA that was stented with a 3.5 x 18 mm Monon SINA with 0% residual stenosis. Patients RFA cath site was closed with an Angio-Seal at the end of the case. Patient tolerated the procedure well and was taken to the telemetry floor for recovery. Echo showed EF 40-45%, basal inferoseptal, basal inferior and mid inferoseptal and mid inferior akinesis and mid inferolateral and apical inferior hypokinesis, mild AR, MR and everely elevated central venous pressure (15 mmHg); IVC diameter is larger than 21 mm and collapses less than 50% with respiration. She was loaded on Brilinta and started on ASA and BB. She transferred out of ICU to telemetry floor the following day. She was monitored another day. The following morning, patient was up feeling well without any complaints of chest pain or shortness of breath. Patient's right femoral cath site was clean, dry and intact without hematoma or bruit. Patient's labs were stable. Patient was seen and examined by Dr. Gustavo Blancas and determined stable and ready for discharge.  Patient was instructed on the importance of medication compliance including taking Aspirin and Brilinta everyday without missing a dose. After receiving drug eluting stents, the patient will remain on dual anti-platelet therapy for 1 year. For maximized medical therapy for CAD, patient will continue BB, ARB and statin. The patient will follow up with [facility]iology Dr. Henri Lino for a TC 7 appt on Monday 8/16 at 9:30 AM and has been referred to cardiac rehab. DISPOSITION: The patient is being discharged home in stable condition on a low saturated fat, low cholesterol and low salt diet. The patient is instructed to advance activities as tolerated to the limit of fatigue or shortness of breath. The patient is instructed to avoid all heavy lifting, straining, stooping or squatting for 3-5 days. The patient is instructed to watch the cath site for bleeding/oozing; if seen, the patient is instructed to apply firm pressure with a clean cloth and call [facility] at [phone]. The patient is instructed to watch for signs of infection which include: increasing area of redness, fever/hot to touch or purulent drainage at the catheterization site. The patient is instructed not to soak in a bathtub for 7-10 days, but is cleared to shower. The patient is instructed to call the office or return to the ER for immediate evaluation for any shortness of breath or chest pain not relieved by NTG. Discharge Exam:   Visit Vitals  /76 (BP 1 Location: Left arm, BP Patient Position: At rest)   Pulse 79   Temp 98.2 °F (36.8 °C)   Resp 16   Ht 5' 4\" (1.626 m)   Wt 93.9 kg (207 lb)   SpO2 98%   BMI 35.53 kg/m²       Patient has been seen by Dr. Shaneka Laureano: see his progress note for exam details.     Recent Results (from the past 24 hour(s))   TROPONIN-HIGH SENSITIVITY    Collection Time: 08/07/21 10:52 PM   Result Value Ref Range    Troponin-High Sensitivity 30,700.8 (HH) 0 - 14 pg/mL   TSH 3RD GENERATION    Collection Time: 08/07/21 10:52 PM   Result Value Ref Range TSH 1.040 0.358 - 3.740 uIU/mL   CBC WITH AUTOMATED DIFF    Collection Time: 08/08/21  4:26 AM   Result Value Ref Range    WBC 10.4 4.3 - 11.1 K/uL    RBC 5.24 (H) 4.05 - 5.2 M/uL    HGB 15.7 (H) 11.7 - 15.4 g/dL    HCT 48.2 (H) 35.8 - 46.3 %    MCV 92.0 79.6 - 97.8 FL    MCH 30.0 26.1 - 32.9 PG    MCHC 32.6 31.4 - 35.0 g/dL    RDW 14.4 11.9 - 14.6 %    PLATELET 160 270 - 114 K/uL    MPV 10.1 9.4 - 12.3 FL    ABSOLUTE NRBC 0.00 0.0 - 0.2 K/uL    DF AUTOMATED      NEUTROPHILS 63 43 - 78 %    LYMPHOCYTES 26 13 - 44 %    MONOCYTES 8 4.0 - 12.0 %    EOSINOPHILS 1 0.5 - 7.8 %    BASOPHILS 1 0.0 - 2.0 %    IMMATURE GRANULOCYTES 1 0.0 - 5.0 %    ABS. NEUTROPHILS 6.6 1.7 - 8.2 K/UL    ABS. LYMPHOCYTES 2.8 0.5 - 4.6 K/UL    ABS. MONOCYTES 0.8 0.1 - 1.3 K/UL    ABS. EOSINOPHILS 0.1 0.0 - 0.8 K/UL    ABS. BASOPHILS 0.1 0.0 - 0.2 K/UL    ABS. IMM.  GRANS. 0.1 0.0 - 0.5 K/UL   METABOLIC PANEL, BASIC    Collection Time: 08/08/21  4:26 AM   Result Value Ref Range    Sodium 142 136 - 145 mmol/L    Potassium 3.2 (L) 3.5 - 5.1 mmol/L    Chloride 111 (H) 98 - 107 mmol/L    CO2 27 21 - 32 mmol/L    Anion gap 4 (L) 7 - 16 mmol/L    Glucose 106 (H) 65 - 100 mg/dL    BUN 14 8 - 23 MG/DL    Creatinine 0.65 0.6 - 1.0 MG/DL    GFR est AA >60 >60 ml/min/1.73m2    GFR est non-AA >60 >60 ml/min/1.73m2    Calcium 8.6 8.3 - 10.4 MG/DL   LIPID PANEL    Collection Time: 08/08/21  4:26 AM   Result Value Ref Range    Cholesterol, total 125 <200 MG/DL    Triglyceride 275 (H) 35 - 150 MG/DL    HDL Cholesterol 29 (L) 40 - 60 MG/DL    LDL, calculated 41 <100 MG/DL    VLDL, calculated 55 (H) 6.0 - 23.0 MG/DL    CHOL/HDL Ratio 4.3     TROPONIN-HIGH SENSITIVITY    Collection Time: 08/08/21  4:26 AM   Result Value Ref Range    Troponin-High Sensitivity 20,885.5 (HH) 0 - 14 pg/mL   EKG, 12 LEAD, INITIAL    Collection Time: 08/08/21  5:33 AM   Result Value Ref Range    Ventricular Rate 91 BPM    Atrial Rate 91 BPM    P-R Interval 158 ms    QRS Duration 80 ms Q-T Interval 344 ms    QTC Calculation (Bezet) 423 ms    Calculated P Axis -8 degrees    Calculated R Axis -15 degrees    Calculated T Axis 35 degrees    Diagnosis       Normal sinus rhythm  Inferior infarct (cited on or before 07-AUG-2021)  Serial changes of Inferior infarct  Abnormal ECG  When compared with ECG of 07-AUG-2021 14:29,  QT has shortened  Confirmed by Omar Giordano ([id number]) on 8/8/2021 5:14:26 PM     GLUCOSE, POC    Collection Time: 08/08/21  7:29 AM   Result Value Ref Range    Glucose (POC) 132 (H) 65 - 100 mg/dL    Performed by Yoanna    ECHO ADULT COMPLETE    Collection Time: 08/08/21 10:59 AM   Result Value Ref Range    LV Mass .0 67.0 - 162.0 g    LV Mass AL Index 78.3 43.0 - 95.0 g/m2    LVES Vol Index BP 8.9 mL/m2    LVED Vol Index BP 28.0 mL/m2    Left Atrium Minor Axis 1.84 cm    LVED Vol Index A4C 25.6 mL/m2    LVED Vol Index A2C 27.4 mL/m2    LVES Vol Index A4C 14.1 mL/m2    LVES Vol Index A2C 6.8 mL/m2    IVSd 0.87 0.60 - 0.90 cm    LVIDd 4.75 3.90 - 5.30 cm    LVIDs 3.82 cm    LVOT d 1.87 cm    LVPWd 1.02 (A) 0.60 - 0.90 cm    BP EF 68.2 55.0 - 100.0 percent    LV Ejection Fraction MOD 2C 75 percent    LV Ejection Fraction MOD 4C 45 percent    LV ED Vol A2C 54.17 mL    LV ED Vol A4C 50.61 mL    LV ED Vol BP 55.39 (A) 56.0 - 104.0 mL    LV ES Vol A2C 13.53 mL    LV ES Vol A4C 27.85 mL    LV ES Vol BP 17.62 (A) 19.0 - 49.0 mL    LVOT Peak Gradient 2.78 mmHg    LVOT Peak Velocity 83.36 cm/s    RVIDd 2.94 cm    Left Atrium Major Axis 3.64 cm    Aortic Valve Area by Continuity of Peak Velocity 1.92 cm2    Aortic Valve Area by Continuity of Peak Velocity 1.92 cm2    AoV PG 5.74 mmHg    Aortic Valve Systolic Peak Velocity 584.42 cm/s    MV A Earl 94.97 cm/s    Mitral Valve E Wave Deceleration Time 190.37 ms    MV E Earl 66.76 cm/s    E/E' ratio (averaged) 12.13     E/E' lateral 9.59     E/E' septal 14.67     LV E' Lateral Velocity 6.96 cm/s    LV E' Septal Velocity 4.55 cm/s Mitral Valve Pressure Half-time 55.21 ms    MVA (PHT) 3.98 cm2    Tapse 1.65 1.50 - 2.00 cm    AO ASC D 3.03 cm    Ao Root D 3.00 cm    MV E/A 0.70    GLUCOSE, POC    Collection Time: 08/08/21 11:54 AM   Result Value Ref Range    Glucose (POC) 90 65 - 100 mg/dL    Performed by [address] [place] [address] Street, POC    Collection Time: 08/08/21  4:32 PM   Result Value Ref Range    Glucose (POC) 97 65 - 100 mg/dL    Performed by Gary    GLUCOSE, POC    Collection Time: 08/08/21  8:57 PM   Result Value Ref Range    Glucose (POC) 116 (H) 65 - 100 mg/dL    Performed by Suleiman          Patient Instructions:   Current Discharge Medication List      START taking these medications    Details   aspirin 81 mg chewable tablet Take 1 Tablet by mouth daily. Start date: 8/9/2021      ticagrelor (BRILINTA) 90 mg tablet Take 1 Tablet by mouth every twelve (12) hours every twelve (12) hours. Qty: 60 Tablet, Refills: 11  Start date: 8/9/2021      metoprolol succinate (TOPROL-XL) 25 mg XL tablet Take 1 Tablet by mouth daily. Qty: 30 Tablet, Refills: 3  Start date: 8/9/2021      nitroglycerin (NITROSTAT) 0.4 mg SL tablet 1 Tablet by SubLINGual route every five (5) minutes as needed for Chest Pain. Up to 3 doses. Qty: 25 Tablet, Refills: 3  Start date: 8/9/2021         CONTINUE these medications which have NOT CHANGED    Details   cyclobenzaprine (FLEXERIL) 10 mg tablet Take 10 mg by mouth two (2) times daily as needed. ondansetron (ZOFRAN ODT) 4 mg disintegrating tablet Take 4 mg by mouth three (3) times daily as needed. ALPRAZolam (XANAX) 1 mg tablet TAKE 1/2 TABLET BY MOUTH TWICE A DAY AND 2 TABLETS AT BEDTIME AS NEEDED FOR ANXIETY      DULoxetine (CYMBALTA) 60 mg capsule Take 60 mg by mouth daily. Jardiance 25 mg tablet Take 25 mg by mouth daily. glipiZIDE SR (GLUCOTROL XL) 5 mg CR tablet Take 5 mg by mouth daily. losartan (COZAAR) 100 mg tablet Take 100 mg by mouth daily.       metFORMIN ER (GLUCOPHAGE XR) 500 mg tablet Take 1,000 mg by mouth two (2) times daily (with meals). pravastatin (PRAVACHOL) 40 mg tablet Take 40 mg by mouth nightly. traZODone (DESYREL) 50 mg tablet Take 50 mg by mouth nightly. triamterene-hydroCHLOROthiazide (MAXZIDE) 75-50 mg per tablet Take 1 Tablet by mouth daily.          STOP taking these medications       ibuprofen (MOTRIN) 800 mg tablet Comments:   Reason for Stopping: increased risk of bleeding with addition of ASA and Brilinta            Dr. Ramses Veras, PA-C

## 2021-08-11 ENCOUNTER — APPOINTMENT (RX ONLY)
Dept: URBAN - METROPOLITAN AREA CLINIC 24 | Facility: CLINIC | Age: 66
Setting detail: DERMATOLOGY
End: 2021-08-11

## 2021-08-11 DIAGNOSIS — Z80.8 FAMILY HISTORY OF MALIGNANT NEOPLASM OF OTHER ORGANS OR SYSTEMS: ICD-10-CM

## 2021-08-11 DIAGNOSIS — Z87.2 PERSONAL HISTORY OF DISEASES OF THE SKIN AND SUBCUTANEOUS TISSUE: ICD-10-CM

## 2021-08-11 PROCEDURE — ? OTHER

## 2021-08-11 PROCEDURE — ? COUNSELING

## 2021-08-11 ASSESSMENT — LOCATION DETAILED DESCRIPTION DERM
LOCATION DETAILED: RIGHT MEDIAL BREAST 12-1:00 REGION
LOCATION DETAILED: RIGHT PROXIMAL POSTERIOR UPPER ARM
LOCATION DETAILED: LEFT ANTERIOR SHOULDER

## 2021-08-11 ASSESSMENT — LOCATION SIMPLE DESCRIPTION DERM
LOCATION SIMPLE: LEFT SHOULDER
LOCATION SIMPLE: RIGHT BREAST
LOCATION SIMPLE: RIGHT POSTERIOR UPPER ARM

## 2021-08-11 ASSESSMENT — LOCATION ZONE DERM
LOCATION ZONE: ARM
LOCATION ZONE: TRUNK

## 2021-08-16 PROBLEM — I25.10 CAD IN NATIVE ARTERY: Status: ACTIVE | Noted: 2021-08-16

## 2021-08-16 PROBLEM — E66.9 OBESITY (BMI 30-39.9): Status: ACTIVE | Noted: 2021-08-16

## 2021-08-16 PROBLEM — I25.5 ISCHEMIC CARDIOMYOPATHY: Status: ACTIVE | Noted: 2021-08-16

## 2021-08-16 PROBLEM — E78.5 HYPERLIPIDEMIA: Status: ACTIVE | Noted: 2021-08-08

## 2021-10-23 ENCOUNTER — APPOINTMENT (OUTPATIENT)
Dept: GENERAL RADIOLOGY | Age: 66
End: 2021-10-23
Attending: EMERGENCY MEDICINE
Payer: MEDICARE

## 2021-10-23 ENCOUNTER — HOSPITAL ENCOUNTER (EMERGENCY)
Age: 66
Discharge: HOME | End: 2021-10-23
Attending: EMERGENCY MEDICINE
Payer: MEDICARE

## 2021-10-23 VITALS
DIASTOLIC BLOOD PRESSURE: 59 MMHG | TEMPERATURE: 98.8 F | RESPIRATION RATE: 19 BRPM | OXYGEN SATURATION: 95 % | SYSTOLIC BLOOD PRESSURE: 114 MMHG | BODY MASS INDEX: 34.15 KG/M2 | WEIGHT: 200 LBS | HEART RATE: 96 BPM | HEIGHT: 64 IN

## 2021-10-23 DIAGNOSIS — I10 HYPERTENSION, UNSPECIFIED TYPE: ICD-10-CM

## 2021-10-23 DIAGNOSIS — I47.1 SVT (SUPRAVENTRICULAR TACHYCARDIA) (HCC): Primary | ICD-10-CM

## 2021-10-23 LAB
ALBUMIN SERPL-MCNC: 3.3 G/DL (ref 3.2–4.6)
ALBUMIN/GLOB SERPL: 0.9 {RATIO} (ref 1.2–3.5)
ALP SERPL-CCNC: 45 U/L (ref 50–136)
ALT SERPL-CCNC: 29 U/L (ref 12–65)
ANION GAP SERPL CALC-SCNC: 6 MMOL/L (ref 7–16)
AST SERPL-CCNC: 19 U/L (ref 15–37)
BASOPHILS # BLD: 0.1 K/UL (ref 0–0.2)
BASOPHILS NFR BLD: 1 % (ref 0–2)
BILIRUB SERPL-MCNC: 0.2 MG/DL (ref 0.2–1.1)
BUN SERPL-MCNC: 16 MG/DL (ref 8–23)
CALCIUM SERPL-MCNC: 8.9 MG/DL (ref 8.3–10.4)
CHLORIDE SERPL-SCNC: 109 MMOL/L (ref 98–107)
CO2 SERPL-SCNC: 28 MMOL/L (ref 21–32)
CREAT SERPL-MCNC: 0.92 MG/DL (ref 0.6–1)
DIFFERENTIAL METHOD BLD: ABNORMAL
EOSINOPHIL # BLD: 0.2 K/UL (ref 0–0.8)
EOSINOPHIL NFR BLD: 2 % (ref 0.5–7.8)
ERYTHROCYTE [DISTWIDTH] IN BLOOD BY AUTOMATED COUNT: 13.8 % (ref 11.9–14.6)
GLOBULIN SER CALC-MCNC: 3.5 G/DL (ref 2.3–3.5)
GLUCOSE SERPL-MCNC: 107 MG/DL (ref 65–100)
HCT VFR BLD AUTO: 55.2 % (ref 35.8–46.3)
HGB BLD-MCNC: 17.8 G/DL (ref 11.7–15.4)
IMM GRANULOCYTES # BLD AUTO: 0.1 K/UL (ref 0–0.5)
IMM GRANULOCYTES NFR BLD AUTO: 1 % (ref 0–5)
LYMPHOCYTES # BLD: 3.3 K/UL (ref 0.5–4.6)
LYMPHOCYTES NFR BLD: 32 % (ref 13–44)
MAGNESIUM SERPL-MCNC: 2.4 MG/DL (ref 1.8–2.4)
MCH RBC QN AUTO: 29.6 PG (ref 26.1–32.9)
MCHC RBC AUTO-ENTMCNC: 32.2 G/DL (ref 31.4–35)
MCV RBC AUTO: 91.7 FL (ref 79.6–97.8)
MONOCYTES # BLD: 0.8 K/UL (ref 0.1–1.3)
MONOCYTES NFR BLD: 7 % (ref 4–12)
NEUTS SEG # BLD: 6.2 K/UL (ref 1.7–8.2)
NEUTS SEG NFR BLD: 58 % (ref 43–78)
NRBC # BLD: 0 K/UL (ref 0–0.2)
PLATELET # BLD AUTO: 260 K/UL (ref 150–450)
PMV BLD AUTO: 9.2 FL (ref 9.4–12.3)
POTASSIUM SERPL-SCNC: 4 MMOL/L (ref 3.5–5.1)
PROT SERPL-MCNC: 6.8 G/DL (ref 6.3–8.2)
RBC # BLD AUTO: 6.02 M/UL (ref 4.05–5.2)
SODIUM SERPL-SCNC: 143 MMOL/L (ref 136–145)
TSH SERPL DL<=0.005 MIU/L-ACNC: 1.07 UIU/ML (ref 0.36–3.74)
WBC # BLD AUTO: 10.6 K/UL (ref 4.3–11.1)

## 2021-10-23 PROCEDURE — 84443 ASSAY THYROID STIM HORMONE: CPT

## 2021-10-23 PROCEDURE — 83735 ASSAY OF MAGNESIUM: CPT

## 2021-10-23 PROCEDURE — 71045 X-RAY EXAM CHEST 1 VIEW: CPT

## 2021-10-23 PROCEDURE — 74011250636 HC RX REV CODE- 250/636: Performed by: EMERGENCY MEDICINE

## 2021-10-23 PROCEDURE — 96374 THER/PROPH/DIAG INJ IV PUSH: CPT

## 2021-10-23 PROCEDURE — 85025 COMPLETE CBC W/AUTO DIFF WBC: CPT

## 2021-10-23 PROCEDURE — 80053 COMPREHEN METABOLIC PANEL: CPT

## 2021-10-23 PROCEDURE — 81003 URINALYSIS AUTO W/O SCOPE: CPT

## 2021-10-23 PROCEDURE — 99285 EMERGENCY DEPT VISIT HI MDM: CPT

## 2021-10-23 PROCEDURE — 93005 ELECTROCARDIOGRAM TRACING: CPT | Performed by: EMERGENCY MEDICINE

## 2021-10-23 RX ORDER — ADENOSINE 3 MG/ML
12 INJECTION, SOLUTION INTRAVENOUS
Status: COMPLETED | OUTPATIENT
Start: 2021-10-23 | End: 2021-10-23

## 2021-10-23 RX ORDER — DILTIAZEM HYDROCHLORIDE 5 MG/ML
20 INJECTION INTRAVENOUS
Status: DISCONTINUED | OUTPATIENT
Start: 2021-10-23 | End: 2021-10-23 | Stop reason: HOSPADM

## 2021-10-23 RX ORDER — ADENOSINE 3 MG/ML
6 INJECTION, SOLUTION INTRAVENOUS
Status: COMPLETED | OUTPATIENT
Start: 2021-10-23 | End: 2021-10-23

## 2021-10-23 RX ORDER — METOPROLOL SUCCINATE 25 MG/1
50 TABLET, EXTENDED RELEASE ORAL DAILY
Qty: 60 TABLET | Refills: 1 | Status: SHIPPED | OUTPATIENT
Start: 2021-10-23 | End: 2021-10-27 | Stop reason: SDUPTHER

## 2021-10-23 RX ADMIN — ADENOSINE 12 MG: 3 INJECTION INTRAVENOUS at 14:15

## 2021-10-23 RX ADMIN — ADENOSINE 6 MG: 3 INJECTION INTRAVENOUS at 14:13

## 2021-10-23 RX ADMIN — SODIUM CHLORIDE 500 ML: 900 INJECTION, SOLUTION INTRAVENOUS at 14:18

## 2021-10-23 NOTE — DISCHARGE INSTRUCTIONS
Follow-up with [facility], call the office to make an appointment. Return to the emergency department if your symptoms recur. Increase your metoprolol dose from 25 mg daily to 50 mg daily.

## 2021-10-23 NOTE — ED TRIAGE NOTES
Pt arrives per EMS from [facility] minute clinic. States she went here for complaints of dizziness x 10 days. At [facility] they found pt has an ear infection. Pt also found to have HR of 150-160. Given 500ml NS by EMS. ALso complains of sore throat and cough. [facility] took a home COVID test that was negative last night. Complains of edema to feet today.

## 2021-10-23 NOTE — ED PROVIDER NOTES
Patient is a 43-year-old female with a past medical history of type 2 diabetes, hypertension and coronary artery disease who presents with a 7-day history of dizziness. She states it seems to be worse when she turns her head from side to side. She was seen at an outside urgent care, was told she had an ear infection. Her heart rate however was 160. EMS was called, they gave her normal saline 500 cc without improvement. She has had a mild cough and sore throat. She took a home Covid test that was negative. She denies similar symptoms in the past. She denies any shortness of breath or chest pain.            Past Medical History:   Diagnosis Date    Depression with anxiety 8/8/2021    DM (diabetes mellitus) ([facility] [address] 8/8/2021    Hypertension     Other ill-defined conditions(799.89)     elevated cholesterol    [facility] 8/7/2021       Past Surgical History:   Procedure Laterality Date    HX APPENDECTOMY      HX BREAST BIOPSY Right     HX CHOLECYSTECTOMY      HX GYN      hysterectomy         Family History:   Problem Relation Age of Onset    Breast Cancer Sister 62    Breast Cancer Sister 76       Social History     Socioeconomic History    Marital status: SINGLE     Spouse name: Not on file    Number of children: Not on file    Years of education: Not on file    Highest education level: Not on file   Occupational History    Not on file   Tobacco Use    Smoking status: Current Every Day Smoker     Packs/day: 1.50     Years: 37.00     Pack years: 55.50    Smokeless tobacco: Never Used   Substance and Sexual Activity    Alcohol use: No    Drug use: No    Sexual activity: Not on file   Other Topics Concern    Not on file   Social History Narrative    Not on file     Social Determinants of Health     Financial Resource Strain:     Difficulty of Paying Living Expenses:    Food Insecurity:     Worried About Running Out of Food in the Last Year:     Ran Out of Food in the Last Year:    Transportation Needs:     Lack of Transportation (Medical):  Lack of Transportation (Non-Medical):    Physical Activity:     Days of Exercise per Week:     Minutes of Exercise per Session:    Stress:     Feeling of Stress :    Social Connections:     Frequency of Communication with Friends and Family:     Frequency of Social Gatherings with Friends and Family:     Attends Confucianist Services:     Active Member of Clubs or Organizations:     Attends Club or Organization Meetings:     Marital Status:    Intimate Partner Violence:     Fear of Current or Ex-Partner:     Emotionally Abused:     Physically Abused:     Sexually Abused: ALLERGIES: Codeine, Lortab [hydrocodone-acetaminophen], Pcn [penicillins], and Tylox [oxycodone-acetaminophen]    Review of Systems   Constitutional: Negative for chills and fever. Respiratory: Negative for shortness of breath. Cardiovascular: Negative for chest pain. Gastrointestinal: Negative for nausea and vomiting. All other systems reviewed and are negative. Vitals:    10/23/21 1349   BP: 136/65   Pulse: (!) 159   Resp: (!) 32   Temp: 99.5 °F (37.5 °C)   SpO2: 97%   Weight: 90.7 kg (200 lb)   Height: 5' 4\" (1.626 m)            Physical Exam  Vitals and nursing note reviewed. Constitutional:       Appearance: Normal appearance. She is well-developed. She is obese. HENT:      Head: Normocephalic and atraumatic. Eyes:      Conjunctiva/sclera: Conjunctivae normal.      Pupils: Pupils are equal, round, and reactive to light. Cardiovascular:      Rate and Rhythm: Regular rhythm. Tachycardia present. Pulmonary:      Effort: Pulmonary effort is normal. No respiratory distress. Breath sounds: No wheezing or rales. Musculoskeletal:         General: No tenderness. Cervical back: Normal range of motion and neck supple. Skin:     General: Skin is warm and dry.    Neurological:      Mental Status: She is alert and oriented to person, place, and time. Psychiatric:         Behavior: Behavior normal.          MDM  Number of Diagnoses or Management Options  Hypertension, unspecified type  SVT (supraventricular tachycardia) ([address]): new and requires workup  Diagnosis management comments: 2:06 PM heart rate is 157 and regular. Patient appears comfortable, will give adenosine and reevaluate. 2:22 PM HR now 96 and sinus rhythm after Adenosine 6mg + 12mg  3:18 PM resting comfortably, heart rate 92 and sinus. Discussed case with Dr. Yolanda Jerome, recommended increasing her metoprolol to 50 mg daily. Discussed this with patient, need for close outpatient follow-up with [facility].        Amount and/or Complexity of Data Reviewed  Clinical lab tests: ordered and reviewed  Tests in the radiology section of CPT®: ordered and reviewed  Tests in the medicine section of CPT®: ordered and reviewed  Discuss the patient with other providers: yes    Risk of Complications, Morbidity, and/or Mortality  Presenting problems: high  Diagnostic procedures: high  Management options: high    Patient Progress  Patient progress: improved         EKG    Date/Time: 10/23/2021 2:06 PM  Performed by: Jonathan Song MD  Authorized by: Jonathan Song MD     ECG reviewed by ED Physician in the absence of a cardiologist: yes    Previous ECG:     Previous ECG:  Unavailable  Interpretation:     Interpretation: abnormal    Rate:     ECG rate:  157    ECG rate assessment: tachycardic    Rhythm:     Rhythm: SVT    Ectopy:     Ectopy: none    QRS:     QRS axis:  Normal    QRS intervals:  Normal  Conduction:     Conduction: normal    ST segments:     ST segments:  Normal  T waves:     T waves: normal      EKG    Date/Time: 10/23/2021 3:19 PM  Performed by: Jonathan Song MD  Authorized by: Jonathan Song MD     ECG reviewed by ED Physician in the absence of a cardiologist: yes    Previous ECG:     Previous ECG:  Compared to current    Comparison ECG info:  HR decreased to 92 from 157 and NSR    Similarity:  Changes noted  Interpretation:     Interpretation: normal    Rate:     ECG rate:  92    ECG rate assessment: normal    Rhythm:     Rhythm: sinus rhythm    Ectopy:     Ectopy: none    QRS:     QRS axis:  Normal    QRS intervals:  Normal  Conduction:     Conduction: normal    ST segments:     ST segments:  Normal  T waves:     T waves: normal    Critical Care  Performed by: Fred Nam MD  Authorized by: Fred Nam MD     Critical care provider statement:     Critical care time (minutes):  40    Critical care time was exclusive of:  Separately billable procedures and treating other patients    Critical care was necessary to treat or prevent imminent or life-threatening deterioration of the following conditions:  Cardiac failure, circulatory failure, dehydration, endocrine crisis, hepatic failure, metabolic crisis, shock and toxidrome    Critical care was time spent personally by me on the following activities:  Blood draw for specimens, development of treatment plan with patient or surrogate, discussions with consultants, evaluation of patient's response to treatment, examination of patient, obtaining history from patient or surrogate, ordering and performing treatments and interventions, ordering and review of laboratory studies, ordering and review of radiographic studies, pulse oximetry and re-evaluation of patient's condition    I assumed direction of critical care for this patient from another provider in my specialty: no

## 2021-10-23 NOTE — ED NOTES
I have reviewed discharge instructions with the patient. The patient verbalized understanding. Patient left ED via Discharge Method: ambulatory to Home with transport from friend. The patient is ambulatory upon exit and appears in no acute distress. The patient has been provided discharge instructions, prescription, and follow up information. Opportunity for questions and clarification provided. Patient given 1 scripts. To continue your aftercare when you leave the hospital, you may receive an automated call from our care team to check in on how you are doing. This is a free service and part of our promise to provide the best care and service to meet your aftercare needs.  If you have questions, or wish to unsubscribe from this service please call [phone]. Thank you for Choosing our [facility] Emergency Department.

## 2021-10-24 LAB
ATRIAL RATE: 258 BPM
CALCULATED R AXIS, ECG10: -30 DEGREES
CALCULATED T AXIS, ECG11: 16 DEGREES
DIAGNOSIS, 93000: NORMAL
Q-T INTERVAL, ECG07: 286 MS
QRS DURATION, ECG06: 80 MS
QTC CALCULATION (BEZET), ECG08: 462 MS
VENTRICULAR RATE, ECG03: 157 BPM

## 2021-10-27 PROBLEM — I47.1 SVT (SUPRAVENTRICULAR TACHYCARDIA) (HCC): Status: ACTIVE | Noted: 2021-10-27

## 2021-10-27 PROBLEM — I87.2 CHRONIC VENOUS INSUFFICIENCY: Status: ACTIVE | Noted: 2021-10-27

## 2022-01-13 ENCOUNTER — APPOINTMENT (RX ONLY)
Dept: URBAN - METROPOLITAN AREA CLINIC 24 | Facility: CLINIC | Age: 67
Setting detail: DERMATOLOGY
End: 2022-01-13

## 2022-01-13 ENCOUNTER — ANESTHESIA EVENT (OUTPATIENT)
Dept: CARDIAC CATH/INVASIVE PROCEDURES | Age: 67
End: 2022-01-13
Payer: MEDICARE

## 2022-01-13 DIAGNOSIS — Z80.8 FAMILY HISTORY OF MALIGNANT NEOPLASM OF OTHER ORGANS OR SYSTEMS: ICD-10-CM

## 2022-01-13 DIAGNOSIS — D17 BENIGN LIPOMATOUS NEOPLASM: ICD-10-CM

## 2022-01-13 DIAGNOSIS — D49.2 NEOPLASM OF UNSPECIFIED BEHAVIOR OF BONE, SOFT TISSUE, AND SKIN: ICD-10-CM

## 2022-01-13 DIAGNOSIS — L82.1 OTHER SEBORRHEIC KERATOSIS: ICD-10-CM

## 2022-01-13 DIAGNOSIS — D18.0 HEMANGIOMA: ICD-10-CM

## 2022-01-13 DIAGNOSIS — Z87.2 PERSONAL HISTORY OF DISEASES OF THE SKIN AND SUBCUTANEOUS TISSUE: ICD-10-CM

## 2022-01-13 DIAGNOSIS — L81.4 OTHER MELANIN HYPERPIGMENTATION: ICD-10-CM

## 2022-01-13 PROCEDURE — ? RECOMMENDATIONS

## 2022-01-13 PROCEDURE — ? OTHER

## 2022-01-13 PROCEDURE — ? COUNSELING

## 2022-01-13 PROCEDURE — ? REFERRAL

## 2022-01-13 RX ORDER — FENTANYL CITRATE 50 UG/ML
100 INJECTION, SOLUTION INTRAMUSCULAR; INTRAVENOUS ONCE
Status: CANCELLED | OUTPATIENT
Start: 2022-01-13 | End: 2022-01-13

## 2022-01-13 RX ORDER — SODIUM CHLORIDE, SODIUM LACTATE, POTASSIUM CHLORIDE, CALCIUM CHLORIDE 600; 310; 30; 20 MG/100ML; MG/100ML; MG/100ML; MG/100ML
100 INJECTION, SOLUTION INTRAVENOUS CONTINUOUS
Status: CANCELLED | OUTPATIENT
Start: 2022-01-13 | End: 2022-01-14

## 2022-01-13 RX ORDER — ONDANSETRON 2 MG/ML
4 INJECTION INTRAMUSCULAR; INTRAVENOUS ONCE
Status: CANCELLED | OUTPATIENT
Start: 2022-01-13 | End: 2022-01-13

## 2022-01-13 RX ORDER — MIDAZOLAM HYDROCHLORIDE 1 MG/ML
2 INJECTION, SOLUTION INTRAMUSCULAR; INTRAVENOUS
Status: CANCELLED | OUTPATIENT
Start: 2022-01-13 | End: 2022-01-14

## 2022-01-13 RX ORDER — DIPHENHYDRAMINE HYDROCHLORIDE 50 MG/ML
12.5 INJECTION, SOLUTION INTRAMUSCULAR; INTRAVENOUS ONCE
Status: CANCELLED | OUTPATIENT
Start: 2022-01-13 | End: 2022-01-13

## 2022-01-13 RX ORDER — LIDOCAINE HYDROCHLORIDE 10 MG/ML
0.1 INJECTION INFILTRATION; PERINEURAL AS NEEDED
Status: CANCELLED | OUTPATIENT
Start: 2022-01-13

## 2022-01-13 RX ORDER — MIDAZOLAM HYDROCHLORIDE 1 MG/ML
2 INJECTION, SOLUTION INTRAMUSCULAR; INTRAVENOUS ONCE
Status: CANCELLED | OUTPATIENT
Start: 2022-01-13 | End: 2022-01-13

## 2022-01-13 RX ORDER — NALOXONE HYDROCHLORIDE 0.4 MG/ML
0.1 INJECTION, SOLUTION INTRAMUSCULAR; INTRAVENOUS; SUBCUTANEOUS AS NEEDED
Status: CANCELLED | OUTPATIENT
Start: 2022-01-13 | End: 2022-01-13

## 2022-01-13 RX ORDER — SODIUM CHLORIDE, SODIUM LACTATE, POTASSIUM CHLORIDE, CALCIUM CHLORIDE 600; 310; 30; 20 MG/100ML; MG/100ML; MG/100ML; MG/100ML
75 INJECTION, SOLUTION INTRAVENOUS CONTINUOUS
Status: CANCELLED | OUTPATIENT
Start: 2022-01-13

## 2022-01-13 ASSESSMENT — LOCATION DETAILED DESCRIPTION DERM
LOCATION DETAILED: LEFT INFERIOR CENTRAL MALAR CHEEK
LOCATION DETAILED: RIGHT RIB CAGE
LOCATION DETAILED: LEFT LATERAL TRAPEZIAL NECK
LOCATION DETAILED: LEFT LATERAL INFERIOR EYELID
LOCATION DETAILED: RIGHT MID-UPPER BACK
LOCATION DETAILED: LEFT INFERIOR UPPER BACK
LOCATION DETAILED: RIGHT MEDIAL BREAST 12-1:00 REGION
LOCATION DETAILED: LEFT LATERAL SUPERIOR CHEST
LOCATION DETAILED: RIGHT ANTERIOR PROXIMAL UPPER ARM
LOCATION DETAILED: LEFT ANTERIOR SHOULDER
LOCATION DETAILED: RIGHT PROXIMAL POSTERIOR UPPER ARM

## 2022-01-13 ASSESSMENT — LOCATION SIMPLE DESCRIPTION DERM
LOCATION SIMPLE: LEFT CHEEK
LOCATION SIMPLE: RIGHT UPPER BACK
LOCATION SIMPLE: LEFT SHOULDER
LOCATION SIMPLE: RIGHT UPPER ARM
LOCATION SIMPLE: RIGHT BREAST
LOCATION SIMPLE: RIGHT POSTERIOR UPPER ARM
LOCATION SIMPLE: LEFT UPPER BACK
LOCATION SIMPLE: CHEST
LOCATION SIMPLE: ABDOMEN
LOCATION SIMPLE: LEFT INFERIOR EYELID
LOCATION SIMPLE: POSTERIOR NECK

## 2022-01-13 ASSESSMENT — LOCATION ZONE DERM
LOCATION ZONE: NECK
LOCATION ZONE: EYELID
LOCATION ZONE: ARM
LOCATION ZONE: TRUNK
LOCATION ZONE: FACE

## 2022-01-13 NOTE — PROCEDURE: OTHER
Other (Free Text): Mother
Other (Free Text): Discussed probable lipoma but excision necessary for definitive diagnosis. Recommended evaluation by general surgeon as neck lesion is too too large for my comfort level.
Note Text (......Xxx Chief Complaint.): This diagnosis correlates with the
Detail Level: Zone

## 2022-01-14 ENCOUNTER — ANESTHESIA (OUTPATIENT)
Dept: CARDIAC CATH/INVASIVE PROCEDURES | Age: 67
End: 2022-01-14
Payer: MEDICARE

## 2022-01-14 NOTE — PROGRESS NOTES
Pt reached after multiple attempts, spoke to friend Arnol Beck ([phone]) who states Ms Karri Gotti is out of state due to recent covid exposure & is now having some symptoms. States that Ms Karri Gotti had told her that she had called & left a message that she would need to reschedule secondary to exposure. Unsure where or whom she left messages with. Informed friend that she would need to contact [facility] once back home to get rescheduled.  Voiced understanding

## 2022-01-27 ENCOUNTER — APPOINTMENT (RX ONLY)
Dept: URBAN - METROPOLITAN AREA CLINIC 24 | Facility: CLINIC | Age: 67
Setting detail: DERMATOLOGY
End: 2022-01-27

## 2022-01-27 DIAGNOSIS — L82.1 OTHER SEBORRHEIC KERATOSIS: ICD-10-CM

## 2022-01-27 DIAGNOSIS — D49.2 NEOPLASM OF UNSPECIFIED BEHAVIOR OF BONE, SOFT TISSUE, AND SKIN: ICD-10-CM

## 2022-01-27 DIAGNOSIS — L30.9 DERMATITIS, UNSPECIFIED: ICD-10-CM

## 2022-01-27 DIAGNOSIS — Z71.89 OTHER SPECIFIED COUNSELING: ICD-10-CM

## 2022-01-27 DIAGNOSIS — Z80.8 FAMILY HISTORY OF MALIGNANT NEOPLASM OF OTHER ORGANS OR SYSTEMS: ICD-10-CM

## 2022-01-27 DIAGNOSIS — D22 MELANOCYTIC NEVI: ICD-10-CM

## 2022-01-27 DIAGNOSIS — D17 BENIGN LIPOMATOUS NEOPLASM: ICD-10-CM

## 2022-01-27 DIAGNOSIS — L57.8 OTHER SKIN CHANGES DUE TO CHRONIC EXPOSURE TO NONIONIZING RADIATION: ICD-10-CM

## 2022-01-27 DIAGNOSIS — Z87.2 PERSONAL HISTORY OF DISEASES OF THE SKIN AND SUBCUTANEOUS TISSUE: ICD-10-CM

## 2022-01-27 DIAGNOSIS — D18.0 HEMANGIOMA: ICD-10-CM

## 2022-01-27 DIAGNOSIS — L57.0 ACTINIC KERATOSIS: ICD-10-CM

## 2022-01-27 PROBLEM — D22.5 MELANOCYTIC NEVI OF TRUNK: Status: ACTIVE | Noted: 2022-01-27

## 2022-01-27 PROBLEM — D18.01 HEMANGIOMA OF SKIN AND SUBCUTANEOUS TISSUE: Status: ACTIVE | Noted: 2022-01-27

## 2022-01-27 PROBLEM — D22.61 MELANOCYTIC NEVI OF RIGHT UPPER LIMB, INCLUDING SHOULDER: Status: ACTIVE | Noted: 2022-01-27

## 2022-01-27 PROBLEM — D48.5 NEOPLASM OF UNCERTAIN BEHAVIOR OF SKIN: Status: ACTIVE | Noted: 2022-01-27

## 2022-01-27 PROCEDURE — 11301 SHAVE SKIN LESION 0.6-1.0 CM: CPT

## 2022-01-27 PROCEDURE — 99213 OFFICE O/P EST LOW 20 MIN: CPT | Mod: 25

## 2022-01-27 PROCEDURE — ? LIQUID NITROGEN

## 2022-01-27 PROCEDURE — ? COUNSELING

## 2022-01-27 PROCEDURE — ? PRESCRIPTION

## 2022-01-27 PROCEDURE — ? SHAVE REMOVAL

## 2022-01-27 PROCEDURE — ? REFERRAL

## 2022-01-27 PROCEDURE — 17003 DESTRUCT PREMALG LES 2-14: CPT

## 2022-01-27 PROCEDURE — 17000 DESTRUCT PREMALG LESION: CPT | Mod: 59

## 2022-01-27 PROCEDURE — ? OTHER

## 2022-01-27 RX ORDER — CLINDAMYCIN PHOSPHATE 10 MG/ML
LOTION TOPICAL
Qty: 60 | Refills: 2 | Status: ERX | COMMUNITY
Start: 2022-01-27

## 2022-01-27 RX ADMIN — CLINDAMYCIN PHOSPHATE: 10 LOTION TOPICAL at 00:00

## 2022-01-27 ASSESSMENT — LOCATION DETAILED DESCRIPTION DERM
LOCATION DETAILED: LEFT LATERAL TRAPEZIAL NECK
LOCATION DETAILED: MIDDLE STERNUM
LOCATION DETAILED: LEFT ANTERIOR PROXIMAL THIGH
LOCATION DETAILED: LEFT POSTERIOR SHOULDER
LOCATION DETAILED: EPIGASTRIC SKIN
LOCATION DETAILED: LEFT DISTAL DORSAL FOREARM
LOCATION DETAILED: RIGHT MEDIAL BREAST 12-1:00 REGION
LOCATION DETAILED: RIGHT MID-UPPER BACK
LOCATION DETAILED: MONS PUBIS
LOCATION DETAILED: NASAL SUPRATIP
LOCATION DETAILED: LEFT ANTERIOR SHOULDER
LOCATION DETAILED: RIGHT ANTERIOR MEDIAL DISTAL UPPER ARM
LOCATION DETAILED: SUPERIOR LUMBAR SPINE
LOCATION DETAILED: LEFT RADIAL DORSAL HAND
LOCATION DETAILED: LEFT SUPERIOR UPPER BACK
LOCATION DETAILED: LEFT FOREHEAD
LOCATION DETAILED: LEFT LATERAL INFERIOR EYELID
LOCATION DETAILED: LEFT SUPERIOR CENTRAL MALAR CHEEK
LOCATION DETAILED: NASAL TIP
LOCATION DETAILED: RIGHT LATERAL PROXIMAL UPPER ARM
LOCATION DETAILED: RIGHT POSTERIOR SHOULDER

## 2022-01-27 ASSESSMENT — LOCATION ZONE DERM
LOCATION ZONE: NOSE
LOCATION ZONE: VULVA
LOCATION ZONE: NECK
LOCATION ZONE: EYELID
LOCATION ZONE: FACE
LOCATION ZONE: HAND
LOCATION ZONE: TRUNK
LOCATION ZONE: LEG
LOCATION ZONE: ARM

## 2022-01-27 ASSESSMENT — LOCATION SIMPLE DESCRIPTION DERM
LOCATION SIMPLE: LEFT THIGH
LOCATION SIMPLE: LEFT HAND
LOCATION SIMPLE: POSTERIOR NECK
LOCATION SIMPLE: LEFT INFERIOR EYELID
LOCATION SIMPLE: LEFT FOREHEAD
LOCATION SIMPLE: LEFT FOREARM
LOCATION SIMPLE: LEFT CHEEK
LOCATION SIMPLE: ABDOMEN
LOCATION SIMPLE: LOWER BACK
LOCATION SIMPLE: RIGHT BREAST
LOCATION SIMPLE: RIGHT UPPER ARM
LOCATION SIMPLE: LEFT SHOULDER
LOCATION SIMPLE: LEFT UPPER BACK
LOCATION SIMPLE: RIGHT SHOULDER
LOCATION SIMPLE: NOSE
LOCATION SIMPLE: GROIN
LOCATION SIMPLE: CHEST
LOCATION SIMPLE: RIGHT UPPER BACK

## 2022-01-27 NOTE — PROCEDURE: OTHER
Note Text (......Xxx Chief Complaint.): This diagnosis correlates with the
Detail Level: Zone
Other (Free Text): There is some mild scarring, suspect resolved furuncle vs hidradenitis based on history.
Render Risk Assessment In Note?: no
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
Other (Free Text): Mother
Other (Free Text): Patient reports skin is numb surrounding nevus
Other (Free Text): Given size, refer to general surgeon

## 2022-01-27 NOTE — HPI: FULL BODY SKIN EXAMINATION
What Type Of Note Output Would You Prefer (Optional)?: Standard Output
What Is The Reason For Today's Visit?: Full Body Skin Examination
What Is The Reason For Today's Visit? (Being Monitored For X): the development of new lesions
How Severe Are Your Spot(S)?: mild
Additional History: Pt gives verbal consent to biopsy. PATTI [place]

## 2022-01-27 NOTE — PROCEDURE: LIQUID NITROGEN
Post-Care Instructions: I reviewed with the patient in detail post-care instructions. Patient is to wear sunprotection, and avoid picking at any of the treated lesions. Pt may apply Vaseline to crusted or scabbing areas.
Number Of Freeze-Thaw Cycles: 1 freeze-thaw cycle
Consent: The patient's consent was obtained including but not limited to risks of crusting, scabbing, blistering, scarring, darker or lighter pigmentary change, recurrence, incomplete removal and infection.
Render Note In Bullet Format When Appropriate: No
Render Post-Care Instructions In Note?: yes
Detail Level: Detailed
Duration Of Freeze Thaw-Cycle (Seconds): 0

## 2022-01-27 NOTE — PROCEDURE: SHAVE REMOVAL
Billing Type: Third-Party Bill
Medical Necessity Information: It is in your best interest to select a reason for this procedure from the list below. All of these items fulfill various CMS LCD requirements except the new and changing color options.
Size Of Lesion In Cm (Required): 0.6
Add Variable For Additional Medical Justification: No
Hemostasis: Drysol
Anesthesia Type: 1% lidocaine with epinephrine
Wound Care: Petrolatum
Consent was obtained from the patient. The risks and benefits to therapy were discussed in detail. Specifically, the risks of infection, scarring, bleeding, prolonged wound healing, incomplete removal, allergy to anesthesia, nerve injury and recurrence were addressed. Prior to the procedure, the treatment site was clearly identified and confirmed by the patient. All components of Universal Protocol/PAUSE Rule completed.
Detail Level: Detailed
Notification Instructions: Patient will be notified of pathology results. However, patient instructed to call the office if not contacted within 2 weeks.
Biopsy Method: Dermablade
Post-Care Instructions: I reviewed with the patient in detail post-care instructions. Patient is to keep the biopsy site dry overnight, and then apply bacitracin twice daily until healed. Patient may apply hydrogen peroxide soaks to remove any crusting.
Medical Necessity Clause: This procedure was medically necessary because the lesion that was treated was:
Accession #: [id number]
X Size Of Lesion In Cm (Optional): 0
Was A Bandage Applied: Yes

## 2022-02-09 NOTE — PROGRESS NOTES
Call received from patient regarding procedure on 3/9/22. Returned call & informed that pre-assessment is usually completed the day prior to procedure on 3/8/22 & that she will be notified of the exact arrival time then. Reinforced that she is to HOLD metoprolol x 2 days prior to procedure (last dose should be 3/6/2022).  Voiced understanding

## 2022-03-07 ENCOUNTER — HOSPITAL ENCOUNTER (OUTPATIENT)
Dept: LAB | Age: 67
Discharge: HOME | End: 2022-03-07
Payer: MEDICARE

## 2022-03-07 DIAGNOSIS — E87.6 HYPOKALEMIA: ICD-10-CM

## 2022-03-07 DIAGNOSIS — I47.1 SVT (SUPRAVENTRICULAR TACHYCARDIA) (HCC): ICD-10-CM

## 2022-03-07 DIAGNOSIS — Z01.812 PRE-PROCEDURE LAB EXAM: ICD-10-CM

## 2022-03-07 DIAGNOSIS — I25.5 ISCHEMIC CARDIOMYOPATHY: ICD-10-CM

## 2022-03-07 LAB
ANION GAP SERPL CALC-SCNC: 8 MMOL/L (ref 7–16)
BASOPHILS # BLD: 0.1 K/UL (ref 0–0.2)
BASOPHILS NFR BLD: 1 % (ref 0–2)
BUN SERPL-MCNC: 14 MG/DL (ref 8–23)
CALCIUM SERPL-MCNC: 9.2 MG/DL (ref 8.3–10.4)
CHLORIDE SERPL-SCNC: 107 MMOL/L (ref 98–107)
CO2 SERPL-SCNC: 23 MMOL/L (ref 21–32)
CREAT SERPL-MCNC: 0.8 MG/DL (ref 0.6–1)
DIFFERENTIAL METHOD BLD: ABNORMAL
EOSINOPHIL # BLD: 0.2 K/UL (ref 0–0.8)
EOSINOPHIL NFR BLD: 2 % (ref 0.5–7.8)
ERYTHROCYTE [DISTWIDTH] IN BLOOD BY AUTOMATED COUNT: 14.7 % (ref 11.9–14.6)
GLUCOSE SERPL-MCNC: 136 MG/DL (ref 65–100)
HCT VFR BLD AUTO: 51.8 % (ref 35.8–46.3)
HGB BLD-MCNC: 16.5 G/DL (ref 11.7–15.4)
IMM GRANULOCYTES # BLD AUTO: 0.1 K/UL (ref 0–0.5)
IMM GRANULOCYTES NFR BLD AUTO: 1 % (ref 0–5)
INR PPP: 0.9
LYMPHOCYTES # BLD: 2.5 K/UL (ref 0.5–4.6)
LYMPHOCYTES NFR BLD: 32 % (ref 13–44)
MAGNESIUM SERPL-MCNC: 1.9 MG/DL (ref 1.8–2.4)
MCH RBC QN AUTO: 28.1 PG (ref 26.1–32.9)
MCHC RBC AUTO-ENTMCNC: 31.9 G/DL (ref 31.4–35)
MCV RBC AUTO: 88.2 FL (ref 79.6–97.8)
MONOCYTES # BLD: 0.4 K/UL (ref 0.1–1.3)
MONOCYTES NFR BLD: 5 % (ref 4–12)
NEUTS SEG # BLD: 4.6 K/UL (ref 1.7–8.2)
NEUTS SEG NFR BLD: 59 % (ref 43–78)
NRBC # BLD: 0 K/UL (ref 0–0.2)
PLATELET # BLD AUTO: 201 K/UL (ref 150–450)
PMV BLD AUTO: 9.8 FL (ref 9.4–12.3)
POTASSIUM SERPL-SCNC: 3.7 MMOL/L (ref 3.5–5.1)
PROTHROMBIN TIME: 12.4 SEC (ref 12.6–14.5)
RBC # BLD AUTO: 5.87 M/UL (ref 4.05–5.2)
SODIUM SERPL-SCNC: 138 MMOL/L (ref 136–145)
WBC # BLD AUTO: 7.8 K/UL (ref 4.3–11.1)

## 2022-03-07 PROCEDURE — 85025 COMPLETE CBC W/AUTO DIFF WBC: CPT

## 2022-03-07 PROCEDURE — 36415 COLL VENOUS BLD VENIPUNCTURE: CPT

## 2022-03-07 PROCEDURE — 83735 ASSAY OF MAGNESIUM: CPT

## 2022-03-07 PROCEDURE — 80048 BASIC METABOLIC PNL TOTAL CA: CPT

## 2022-03-07 PROCEDURE — 85610 PROTHROMBIN TIME: CPT

## 2022-03-07 NOTE — PROGRESS NOTES
Patient pre-assessment complete for SVT ablation with Dr Jacklyn Davenport scheduled for 3/9/22 at 10:30am, arrival time 8:30am. Patient verified using . Patient instructed to bring a list of all home medications on the day of procedure. NPO status reinforced. Patient instructed to HOLD metoprolol x 2 days (last dose 3/6/22) & HOLD metformin, losartan, glipizide & actos the am of procedure. Instructed they can take all other medications excluding vitamins & supplements. Patient verbalizes understanding of all instructions & denies any questions at this time.

## 2022-03-09 ENCOUNTER — HOSPITAL ENCOUNTER (OUTPATIENT)
Age: 67
Setting detail: OUTPATIENT SURGERY
Discharge: HOME | End: 2022-03-09
Attending: INTERNAL MEDICINE | Admitting: INTERNAL MEDICINE
Payer: MEDICARE

## 2022-03-09 VITALS
TEMPERATURE: 97.8 F | SYSTOLIC BLOOD PRESSURE: 158 MMHG | DIASTOLIC BLOOD PRESSURE: 83 MMHG | OXYGEN SATURATION: 94 % | BODY MASS INDEX: 36.37 KG/M2 | WEIGHT: 213 LBS | RESPIRATION RATE: 12 BRPM | HEART RATE: 98 BPM | HEIGHT: 64 IN

## 2022-03-09 DIAGNOSIS — I10 HYPERTENSION, UNSPECIFIED TYPE: Chronic | ICD-10-CM

## 2022-03-09 DIAGNOSIS — I25.10 CAD IN NATIVE ARTERY: ICD-10-CM

## 2022-03-09 DIAGNOSIS — I47.1 SVT (SUPRAVENTRICULAR TACHYCARDIA) (HCC): ICD-10-CM

## 2022-03-09 LAB
GLUCOSE BLD STRIP.AUTO-MCNC: 95 MG/DL (ref 65–100)
SERVICE CMNT-IMP: NORMAL

## 2022-03-09 PROCEDURE — 74011250636 HC RX REV CODE- 250/636: Performed by: INTERNAL MEDICINE

## 2022-03-09 PROCEDURE — 74011250637 HC RX REV CODE- 250/637: Performed by: ANESTHESIOLOGY

## 2022-03-09 PROCEDURE — C1760 CLOSURE DEV, VASC: HCPCS | Performed by: INTERNAL MEDICINE

## 2022-03-09 PROCEDURE — C1894 INTRO/SHEATH, NON-LASER: HCPCS | Performed by: INTERNAL MEDICINE

## 2022-03-09 PROCEDURE — C1730 CATH, EP, 19 OR FEW ELECT: HCPCS | Performed by: INTERNAL MEDICINE

## 2022-03-09 PROCEDURE — 74011250637 HC RX REV CODE- 250/637: Performed by: INTERNAL MEDICINE

## 2022-03-09 PROCEDURE — 76060000033 HC ANESTHESIA 1 TO 1.5 HR: Performed by: INTERNAL MEDICINE

## 2022-03-09 PROCEDURE — 77030027107 HC PTCH EXT REF CARTO3 J&J -F: Performed by: INTERNAL MEDICINE

## 2022-03-09 PROCEDURE — 74011000250 HC RX REV CODE- 250: Performed by: NURSE ANESTHETIST, CERTIFIED REGISTERED

## 2022-03-09 PROCEDURE — 93620 COMP EP EVL R AT VEN PAC&REC: CPT | Performed by: INTERNAL MEDICINE

## 2022-03-09 PROCEDURE — C1732 CATH, EP, DIAG/ABL, 3D/VECT: HCPCS | Performed by: INTERNAL MEDICINE

## 2022-03-09 PROCEDURE — 93613 INTRACARDIAC EPHYS 3D MAPG: CPT | Performed by: INTERNAL MEDICINE

## 2022-03-09 PROCEDURE — 93621 COMP EP EVL L PAC&REC C SINS: CPT | Performed by: INTERNAL MEDICINE

## 2022-03-09 PROCEDURE — 77030035291 HC TBNG PMP SMARTABLATE J&J -B: Performed by: INTERNAL MEDICINE

## 2022-03-09 PROCEDURE — 74011250636 HC RX REV CODE- 250/636: Performed by: NURSE ANESTHETIST, CERTIFIED REGISTERED

## 2022-03-09 PROCEDURE — 77030013687 HC GD NDL BARD -B: Performed by: INTERNAL MEDICINE

## 2022-03-09 PROCEDURE — C1759 CATH, INTRA ECHOCARDIOGRAPHY: HCPCS | Performed by: INTERNAL MEDICINE

## 2022-03-09 PROCEDURE — 93623 PRGRMD STIMJ&PACG IV RX NFS: CPT | Performed by: INTERNAL MEDICINE

## 2022-03-09 PROCEDURE — 93662 INTRACARDIAC ECG (ICE): CPT | Performed by: INTERNAL MEDICINE

## 2022-03-09 PROCEDURE — 82962 GLUCOSE BLOOD TEST: CPT

## 2022-03-09 PROCEDURE — 93653 COMPRE EP EVAL TX SVT: CPT | Performed by: INTERNAL MEDICINE

## 2022-03-09 PROCEDURE — 74011000250 HC RX REV CODE- 250: Performed by: INTERNAL MEDICINE

## 2022-03-09 RX ORDER — LIDOCAINE HYDROCHLORIDE 10 MG/ML
INJECTION INFILTRATION; PERINEURAL AS NEEDED
Status: DISCONTINUED | OUTPATIENT
Start: 2022-03-09 | End: 2022-03-09 | Stop reason: HOSPADM

## 2022-03-09 RX ORDER — MIDAZOLAM HYDROCHLORIDE 1 MG/ML
2 INJECTION, SOLUTION INTRAMUSCULAR; INTRAVENOUS ONCE
Status: CANCELLED | OUTPATIENT
Start: 2022-03-09 | End: 2022-03-09

## 2022-03-09 RX ORDER — FAMOTIDINE 20 MG/1
20 TABLET, FILM COATED ORAL ONCE
Status: COMPLETED | OUTPATIENT
Start: 2022-03-09 | End: 2022-03-09

## 2022-03-09 RX ORDER — HEPARIN SODIUM 200 [USP'U]/100ML
INJECTION, SOLUTION INTRAVENOUS AS NEEDED
Status: DISCONTINUED | OUTPATIENT
Start: 2022-03-09 | End: 2022-03-09 | Stop reason: HOSPADM

## 2022-03-09 RX ORDER — LIDOCAINE HYDROCHLORIDE 20 MG/ML
INJECTION, SOLUTION EPIDURAL; INFILTRATION; INTRACAUDAL; PERINEURAL AS NEEDED
Status: DISCONTINUED | OUTPATIENT
Start: 2022-03-09 | End: 2022-03-09 | Stop reason: HOSPADM

## 2022-03-09 RX ORDER — METOPROLOL SUCCINATE 50 MG/1
25 TABLET, EXTENDED RELEASE ORAL DAILY
Status: DISCONTINUED | OUTPATIENT
Start: 2022-03-10 | End: 2022-03-09

## 2022-03-09 RX ORDER — SODIUM CHLORIDE, SODIUM LACTATE, POTASSIUM CHLORIDE, CALCIUM CHLORIDE 600; 310; 30; 20 MG/100ML; MG/100ML; MG/100ML; MG/100ML
75 INJECTION, SOLUTION INTRAVENOUS CONTINUOUS
Status: CANCELLED | OUTPATIENT
Start: 2022-03-09 | End: 2022-03-10

## 2022-03-09 RX ORDER — SODIUM CHLORIDE, SODIUM LACTATE, POTASSIUM CHLORIDE, CALCIUM CHLORIDE 600; 310; 30; 20 MG/100ML; MG/100ML; MG/100ML; MG/100ML
INJECTION, SOLUTION INTRAVENOUS
Status: DISCONTINUED | OUTPATIENT
Start: 2022-03-09 | End: 2022-03-09 | Stop reason: HOSPADM

## 2022-03-09 RX ORDER — FENTANYL CITRATE 50 UG/ML
100 INJECTION, SOLUTION INTRAMUSCULAR; INTRAVENOUS ONCE
Status: CANCELLED | OUTPATIENT
Start: 2022-03-09 | End: 2022-03-09

## 2022-03-09 RX ORDER — HYDROMORPHONE HYDROCHLORIDE 2 MG/ML
0.5 INJECTION, SOLUTION INTRAMUSCULAR; INTRAVENOUS; SUBCUTANEOUS
Status: CANCELLED | OUTPATIENT
Start: 2022-03-09

## 2022-03-09 RX ORDER — SODIUM CHLORIDE, SODIUM LACTATE, POTASSIUM CHLORIDE, CALCIUM CHLORIDE 600; 310; 30; 20 MG/100ML; MG/100ML; MG/100ML; MG/100ML
75 INJECTION, SOLUTION INTRAVENOUS CONTINUOUS
Status: CANCELLED | OUTPATIENT
Start: 2022-03-09

## 2022-03-09 RX ORDER — PROPOFOL 10 MG/ML
INJECTION, EMULSION INTRAVENOUS AS NEEDED
Status: DISCONTINUED | OUTPATIENT
Start: 2022-03-09 | End: 2022-03-09 | Stop reason: HOSPADM

## 2022-03-09 RX ORDER — METOPROLOL SUCCINATE 50 MG/1
25 TABLET, EXTENDED RELEASE ORAL ONCE
Status: COMPLETED | OUTPATIENT
Start: 2022-03-09 | End: 2022-03-09

## 2022-03-09 RX ORDER — PROPOFOL 10 MG/ML
INJECTION, EMULSION INTRAVENOUS
Status: DISCONTINUED | OUTPATIENT
Start: 2022-03-09 | End: 2022-03-09 | Stop reason: HOSPADM

## 2022-03-09 RX ORDER — LIDOCAINE HYDROCHLORIDE 10 MG/ML
0.1 INJECTION INFILTRATION; PERINEURAL AS NEEDED
Status: CANCELLED | OUTPATIENT
Start: 2022-03-09

## 2022-03-09 RX ADMIN — FAMOTIDINE 20 MG: 20 TABLET, FILM COATED ORAL at 10:44

## 2022-03-09 RX ADMIN — PROPOFOL 160 MCG/KG/MIN: 10 INJECTION, EMULSION INTRAVENOUS at 11:04

## 2022-03-09 RX ADMIN — SODIUM CHLORIDE, SODIUM LACTATE, POTASSIUM CHLORIDE, AND CALCIUM CHLORIDE: 600; 310; 30; 20 INJECTION, SOLUTION INTRAVENOUS at 10:58

## 2022-03-09 RX ADMIN — METOPROLOL SUCCINATE 25 MG: 50 TABLET, EXTENDED RELEASE ORAL at 14:44

## 2022-03-09 RX ADMIN — PROPOFOL 40 MG: 10 INJECTION, EMULSION INTRAVENOUS at 11:03

## 2022-03-09 RX ADMIN — ISOPROTERENOL HYDROCHLORIDE 2 MCG/MIN: 0.2 INJECTION, SOLUTION INTRAMUSCULAR; INTRAVENOUS at 11:25

## 2022-03-09 RX ADMIN — LIDOCAINE HYDROCHLORIDE 40 MG: 20 INJECTION, SOLUTION EPIDURAL; INFILTRATION; INTRACAUDAL; PERINEURAL at 11:03

## 2022-03-09 NOTE — PROGRESS NOTES
Pt states she cannot urinate lying down and refused a purewick. . Pt in and out cathed with returnof 700ml of clear yellow urine.   States she feel better

## 2022-03-09 NOTE — ANESTHESIA POSTPROCEDURE EVALUATION
Procedure(s):  ABLATION SVT  EP STUDY COMPLETE.    total IV anesthesia    Anesthesia Post Evaluation      Multimodal analgesia: multimodal analgesia not used between 6 hours prior to anesthesia start to PACU discharge  Patient location during evaluation: PACU  Patient participation: complete - patient participated  Level of consciousness: awake and alert  Pain management: adequate  Airway patency: patent  Anesthetic complications: no  Cardiovascular status: hemodynamically stable  Respiratory status: acceptable  Hydration status: acceptable        INITIAL Post-op Vital signs:   Vitals Value Taken Time   /89 03/09/22 1324   Temp     Pulse 105 03/09/22 1354   Resp     SpO2 91 % 03/09/22 1354   Vitals shown include unvalidated device data.

## 2022-03-09 NOTE — Clinical Note
Bilateral groin clipped prepped with ChloraPrep and draped. Wet prep solution applied at: 1100. Wet prep solution dried at: 1105. Wet prep elapsed drying time: 5 mins.

## 2022-03-09 NOTE — PROCEDURES
: Licha Weinberg. Amalia Celeste MD    REFERRING: Chanda Puentes MD    Pre-Electrophysiology Diagnosis  1. Atrial tachycardia  2. SVT      Procedure Performed  1. EPS with SVT ablation  2. Left atrial pacing recording from the coronary sinus. 3. 3-D Electroanatomical mapping  4. Intracardiac echo  5. Drug infusion    Anesthesia: MAC     Estimated Blood Loss: Less than 50 cc     Specimens: * No specimens in log *    Antibiotics: None    Complications: None    Fluoroscopy Time: 0 minutes     Procedure in Detail:  The patient was brought to the electrophysiology lab in the fasting state. The patient underwent MAC anesthesia provided by the anesthesia staff. A Ref-Star CARTO patch was placed, the patient was then prepped and draped in sterile fashion. Venous access was obtained under ultrasound guidance x4 using modified Seldinger technique, with placement of three 8 Fr short sidearm sheaths into the right femoral vein and placement of a 10 Fr sheath into the left femoral vein. The patient presented to the EP lab in normal sinus rhythm. An intra-cardiac echo probe was prepped, and then inserted into an 10 Fr short sheath and used to localize the fossa ovalis and create LA and pulmonary vein anatomy utilizing [employer] [facility]. A Biosense Monahan Pentaray catheter was then inserted into an 8.5 SL1 Fr sheath and used to create a 3D electroanatomical map of the right atrium, including attempts at His localization and the os of the CS with a focus on minimizing fluoroscopic radiation. Then a Smart Touch porous irrigated BW 3.5 mm ablation catheter was used to map out the body of the CS. A decapolar (Deca-Torin) Biosense Monahan CS catheter was inserted via an 8 Fr sheath and positioned in the mid coronary sinus. The patient was started on Isuprel at 2 mcg/min and a tachycardia was induced. A LAT map was created with the use of the Pentaray catheter during the clinical tachycardia. The TCL was 366 msec.  It demonstrated a VAAV response to VOP. During LAT mapping, the arrhythmia appeared to be a focal tachycardia with the earliest pre p-wave signal along the posterolateral superior region of the RA. There were fractionated signals in this area. Prior to ablation, the phrenic nerve was carefully mapped starting in the SVC. Ablation was then performed in the area of interested. Upon ablation, the tachycardia sped up and then terminated within 7-10 beats. Further ablation was performed in this area. Repeat Isuprel testing was performed and there was no further induction of arrhythmia. Next, baseline recordings and a diagnostic EP study was performed. The coronary sinus multipolar catheter was used to pace the left atrium during the EP study. The LA CS electrograms were documented and interpreted during the procedure. A comprehensive EP study was performed with 1:1 AV decremental pacing, atrial extrastimuli and ventricular pacing to assess retrograde conduction. The patient did not have sustained slow pathway conduction or evidence of an accessory pathway. Ventricular pacing revealed retrograde AV conduction which was concentric and decremental.    At the completion of the ablation and EPS and all catheters were removed. Four VASCADE MVP devices were used to close the venotomy sites. The MVP tools were advanced into the 8 Fr and 10 Fr sheaths, respectively; the sheaths were then removed. The collagen was then deployed and a 30 second dwell time was performed to allow for expansion of the collagen. The delivery tools were then removed with adequate hemostasis. The patient tolerated the procedure well with no acute complications recognized. The patient left the EP lab in stable condition, in normal sinus rhythm. Just prior to pulling shealths, the ICE catheter was used to obtain ultrasound images and revealed no evidence of pericardial effusion.     ABLATION DATA:    Baseline Intervals:  AH interval: 58 msec  HV interval: 40 msec  MD interval: 182 msec  QRS duration: 92 msec  QT interval: 354 msec  RR interval: 627 msec    EP Study  AV Wenchebach: 340 msec  AV oneyda ERP: < or equal to 400/180 msec  Atrial ERP: 400/180 msec  VA Wenchebach: <300 msec    TCL: 366 msec     Figure 1. 3D electroanatomic map of the right atrium with area of successful ablation in the posterolateral superior RA. (Area of red noting earliest conduction). Figure 2. 3D electroanatomic map of the RA noting the AT and area of successful ablation. Plan of care: The patient will be placed in the same-day discharge protocol, 2-3 hour flat time, followed by ambulation as tolerated and will continue anticoagulation as prescribed pre-procedure. Impression:   1. Successful SVT ablation. An atrial tachycardia ablation was performed. 2.  Comprehensive EP study with normal intra-atrial, AV oneyda and infrahissian conduction times. Plan:   1. Continue OAC (ASA/plavix) indefinitely until EP follow up. 2.  Continue AAD (metoprolol) for at least 4-6 weeks and stop if no AF. 3.  Family updated with plan. 4.  Routine cardiology follow up with Dr. Ita Clancy. 5.  Patient and family updated about small risk of atrioesophageal fistula and need for emergent ED evaluation if any concerning symptoms arise. Andrea Medina MD, MS  Clinical Cardiac Electrophysiology  3/9/2022  12:12 PM

## 2022-03-09 NOTE — PROGRESS NOTES
Report received from [address] [place] [address]. Procedural finding communicated. Intra procedural medication administration reviewed. Progression of care discussed. Patient received into CPRU room 7, Post SVT ablation    Access site without bleeding or swelling. bilat groin    Patient instructed to limit movement of bilat lower extremity. Routine post procedural vital signs & site assessment initiated.

## 2022-03-09 NOTE — H&P
The patient has been examined and the previous clinic note dated, 2021, has been reviewed and changes have been noted below. 73year old female presents for EPS/ablation. She has undergone informed consent and will proceed with planned procedure under MAC. Liliane Brewster. Micaela Ron MD, [address]  Clinical Cardiac Electrophysiology  [facility] Cardiology        NAME:  Juan Snyder  : 1955  MRN: [id number]      Referring Cardiologist: Clarissa Rogers MD     Reason for Consultation: SVT      ASSESSMENT and PLAN:  Diagnoses and all orders for this visit:     1. SVT (supraventricular tachycardia) (HCC)     2. Hypertension, unspecified type     3. NSTEMI (non-ST elevated myocardial infarction) ([address]     4. CAD in native artery     5. Ischemic cardiomyopathy     6. Chronic venous insufficiency     7. Tobacco abuse     73year old female with a history of ICM, EF 40-45%, with recent ED visit for pSVT which terminated with adenosine. 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 She really doesn't have much in the way of symptoms, in fact, she didn't know she was in SVT when she went to urgent care (she went for an ear ache). We discussed treatment options including doing nothing further/monitoring vs medication vs EPS/ablation. She is intolerant to beta blockers and is not interested in further medicines. She has decided to monitor and we will further assess for SVT/arrhythmia burden.      -Monitor reviewed, c/w with long episodes of pSVT. -Metoprolol 25 mg po daily (pt with nausea from metoprolol, will try splitting up BID). -Plan for EPS/ablation given prolonged episodes of SVT.      Procedure to be performed: EPS/ablation  Device/ablation Company: [employer]  Procedure Date: TBD  Medications to hold, days to hold ([facility], AAD): Metoprolol, 2 days. Anesthesia: MAC      We discussed in detail today the differential diagnosis and pathophysiology of PSVT, including atrial tachycardia, AVNRT and AVRT.   We also discussed medical management versus catheter ablation of each of these potential rhythms, including the expected success rates and possible complications of each. The benefits and risks of each of these approaches were discussed, and the patient was potentially interested in catheter ablation. The catheter ablation procedure was described to the patient in detail, including the risks of recurrent arrhythmia, stroke, cardiac perforation with the need for catheter drainage or surgical repair, vascular damage, DVT/PE, bleeding, pneumo/hemothorax, need for permanent pacemaker, phrenic or vagus nerve damage resulting in diaphragmatic paralysis or gastroparesis, thermal skin burns, radiation skin injury, kidney damage/failure , oversedation, coma and even death. After this discussion, the patient understands these risks and wishes to proceed with catheter ablation, which will be arranged at the earliest convenience. All antiarrhythmic and AV oneyda blocking agents will be held for 3-5 days pre-procedure to facilitate arrhythmia induction at the time of EP study. Further recommendations will be made pending the findings and outcome of the procedure.     Patient has been instructed and agrees to call our office with any issues or other concerns related to their cardiac condition(s) and/or complaint(s). Thank you for allowing me to participate in the electrophysiologic care of Ms. Marga Capone. Please contact me if any questions or concerns were to arise.     Chioma Medina MD, MS  Clinical Cardiac Electrophysiology  [facility] Cardiology  12/20/21  10:19 AM     ===================================================================  Chief Complant:         Chief Complaint   Patient presents with    Rapid Heart Rate       6 weeks fu    Results       monitor         Consultation is requested by Laina Granados for evaluation of Rapid Heart Rate (6 weeks fu) and Results (monitor)        History:  Renata Nair is a most pleasant 73 y.o. female with a past medical and cardiac history significant for CAD status post PCI, ICM EF 40-45% with inferior scar, hyperlipidemia, and diabetes who presents for EP evaluation. She is referred by Dr. Varun Cotni. The patient underwent LHC for a late presentation MI in August, see below. The patient visited the emergency department on October 23 for near syncope in the setting of an ear ache.  The patient was found to have SVT.  Adenosine was given and the patient returned to sinus rhythm.  Metoprolol was increased to 50 mg daily.  The patient had an echocardiogram in August 2021 that was noted to demonstrate an EF of 40 to 45%.  Mild AI and mild MR were noted.  She denies chest pain and dyspnea.  The patient says she feels \"really tired, a feeling of doom, tearfulness, and easily annoyed\".  The patient reports dizziness with turning her head. She also reports nausea with metoprolol as well as a dry mouth.      In consultation, she didn't really know she was in SVT until an ECG was performed when she was found to have an elevated HR. She doesn't know when she was in SVT, hard to know how often this may be happening. She is unable to tolerate beta blockers. She is a former nurse. She continues to smoke (vape). The patient otherwise denies chest pain, dyspnea, presyncope, syncope or lateralizing symptoms.     Cardiac PMH: (Old records have been reviewed and summarized below)     EKG:  (EKG has been independently visualized by me with interpretation below): NSR, normal axis, no ischemia.      Monitor: [employer], c/w runs of SVT for up to 1-2 days. HR around 140 bpm.     ECHO: 8/2021  · LV: Estimated LVEF is 40 - 45%. Normal cavity size, wall thickness and diastolic function. Low normal systolic function. · Wall Scoring: The following segments are akinetic: basal inferoseptal, basal inferior, mid inferoseptal and mid inferior. The following segments are hypokinetic: mid inferolateral and apical inferior.  All other segments are normal.  · Right Ventricle: Normal cavity size and global systolic function. · AV: Probably trileaflet aortic valve. Mild aortic valve regurgitation is present. · MV: Mild mitral valve regurgitation is present. · IVC: Severely elevated central venous pressure (15 mmHg); IVC diameter is larger than 21 mm and collapses less than 50% with respiration. · Image quality for this study was technically difficult. · Contrast used: DEFINITY.     Previous Heart Catheterization: 8/2021  Left Main   The vessel was visualized by angiography. The vessel is angiographically normal. 5FR EBU3.0   Left Anterior Descending   The vessel was visualized by angiography. The vessel exhibits minimal luminal irregularities. Left Circumflex   The vessel was visualized by angiography. The vessel exhibits minimal luminal irregularities. Right Coronary Artery   The vessel was visualized by angiography. There is severe disease. 5FR JR5   Prox RCA lesion, 100% stenosed. Lesion is the culprit lesion. The lesion was not previously treated.      Stent   Drug-eluting stent was successfully placed. Supplies used: STENT COR SINA 3.74P72BH -- SINA RESOLUTE KEY   Post-Intervention Lesion Assessment   The intervention was successful. The guidewire crossed the lesion. There is no pre-intervention MIGUELANGEL flow. Post-intervention MIGUELANGEL flow is 3. There were no complications. Intervention to prox RCA. 6FR JR4 guide. Heparin for act greater than 240. BMW wire passed down artery. 2.5 x 12 compliant balloon dilated lesion with return of MIGUELANGEL 3 flow to vessel. 3.5 x 18 Key stent placed. Post dilated with 3.5 x 15 mm NC balloon at high pressure. 200 mcg IC NTG given for some spasm. Loaded with Brilinta on table. Short root caused us to switch from initial radial approach to femoral, TR band for fem site, Angioseal for femoral site.    Supplies used: STENT COR SINA 3.96L46TF -- SINA RESOLUTE KEY   There is a 0% residual stenosis post intervention.         Stress Test: n/a    DEVICE INTERROGATION: n/a      Past Medical History, Past Surgical History, Family history, Social History, and Medications were all reviewed with the patient today and updated as necessary.             Current Outpatient Medications   Medication Sig Dispense Refill    pioglitazone (ACTOS) 15 mg tablet          metoprolol succinate (TOPROL-XL) 25 mg XL tablet  (Patient not taking: Reported on 11/9/2021)        clopidogreL (Plavix) 75 mg tab Take 1 Tablet by mouth daily. 90 Tablet 3    gabapentin (NEURONTIN) 600 mg tablet Take 600 mg by mouth two (2) times a day.        aspirin 81 mg chewable tablet Take 1 Tablet by mouth daily.        nitroglycerin (NITROSTAT) 0.4 mg SL tablet 1 Tablet by SubLINGual route every five (5) minutes as needed for Chest Pain. Up to 3 doses. 25 Tablet 3    ALPRAZolam (XANAX) 1 mg tablet TAKE 1/2 TABLET BY MOUTH TWICE A DAY AND 2 TABLETS AT BEDTIME AS NEEDED FOR ANXIETY        cyclobenzaprine (FLEXERIL) 10 mg tablet Take 10 mg by mouth two (2) times daily as needed.        DULoxetine (CYMBALTA) 60 mg capsule Take 60 mg by mouth daily.        glipiZIDE SR (GLUCOTROL XL) 5 mg CR tablet Take 5 mg by mouth daily.        losartan (COZAAR) 100 mg tablet Take 100 mg by mouth daily.        metFORMIN ER (GLUCOPHAGE XR) 500 mg tablet Take 1,000 mg by mouth two (2) times daily (with meals).         pravastatin (PRAVACHOL) 40 mg tablet Take 40 mg by mouth nightly.        traZODone (DESYREL) 50 mg tablet Take 100 mg by mouth nightly.                Allergies   Allergen Reactions    Pseudofed Other (comments)       Does not sleep for days     Codeine Other (comments)       Prevents sleep    Lortab [Hydrocodone-Acetaminophen] Other (comments)       Tearful       Pcn [Penicillins] Unknown (comments)       Per pt \"not allergic\"    Tramadol Other (comments)       Prevents sleep     Tylox [Oxycodone-Acetaminophen] Itching          Patient Active Problem List     Diagnosis    SVT (supraventricular tachycardia) (HCC)    Chronic venous insufficiency    CAD in native artery    Ischemic cardiomyopathy    Obesity (BMI 30-39. 9)    NSTEMI (non-ST elevated myocardial infarction) ([facility]    HTN (hypertension)    Hyperlipidemia    DM (diabetes mellitus) ([facility]    Tobacco abuse    Depression with anxiety    Hypokalemia              Past Medical History:   Diagnosis Date    Depression with anxiety 8/8/2021    DM (diabetes mellitus) ([facility] 8/8/2021    Hypertension      Other ill-defined conditions(799.89)       elevated cholesterol    STEMI (ST elevation myocardial infarction) ([facility] 8/7/2021            Past Surgical History:   Procedure Laterality Date    HX APPENDECTOMY        HX BREAST BIOPSY Right      HX CHOLECYSTECTOMY        HX GYN         hysterectomy            Family History   Problem Relation Age of Onset    Breast Cancer Sister 62    Breast Cancer Sister 76      Social History            Tobacco Use    Smoking status: Current Some Day Smoker       Packs/day: 1.50       Years: 37.00       Pack years: 55.50       Last attempt to quit: 2018       Years since quitting: 3.9    Smokeless tobacco: Never Used    Tobacco comment: PT VAPES   Substance Use Topics    Alcohol use: No         ROS:  A comprehensive review of systems was performed with the pertinent positives and negatives as noted in the HPI in addition to:     Review of Systems   Constitutional: Negative. HENT: Negative. Eyes: Negative. Respiratory: Negative. Cardiovascular: Negative. Gastrointestinal: Negative. Genitourinary: Negative. Musculoskeletal: Negative. Skin: Negative. Neurological: Negative. Endo/Heme/Allergies: Negative.     Psychiatric/Behavioral: Negative.       PHYSICAL EXAM:     Visit Vitals  /72   Pulse 97   Ht 5' 4\" (1.626 m)   Wt 213 lb (96.6 kg)   BMI 36.56 kg/m²             Wt Readings from Last 3 Encounters:   12/20/21 213 lb (96.6 kg)   11/09/21 211 lb 1.6 oz (95.8 kg)   10/27/21 210 lb (95.3 kg)          BP Readings from Last 3 Encounters:   12/20/21 138/72   11/09/21 (!) 142/86   10/27/21 92/62         Gen: Well appearing, well developed, no acute distress  Eyes: Pupils equal, round.  Extraocular movements are intact  ENT: Oropharynx clear, no oral lesions, normal dentition  CV: S1S2, regular rate and rhythm, no murmurs, rubs or gallops, normal JVD, no carotid bruits, normal distal pulses, no DINORA  Pulm: Clear to auscultation bilaterally, no accessory muscle uses, no wheezes or rales  GI: Soft, NT, ND, +BS  Neuro: Alert and oriented, nonfocal  Psych: Appropriate affect  Skin: Normal color and skin turgor  MSK: Normal muscle bulk and tone

## 2022-03-09 NOTE — Clinical Note
Sheath #4: Sheath: inserted. Sheath inserted/placed in the left femoral  vein. Upon evaluation of the common femoral artery stick using fluoroscopy, the access site puncture was within the safe zone.

## 2022-03-09 NOTE — DISCHARGE INSTRUCTIONS
ELECTROPHYSIOLOGY STUDY/ABLATION DISCHARGE INSTRUCTIONS    Your Recovery: You had an electrophysiology study for a problem with your heartbeat. You may also have had a catheter ablation to try to correct the problem. You may have swelling, bruising, or a small lump around the site where the catheters went into your body. These should go away in 3 to 4 weeks. Going home:    · Do not drive for 24 hours  · You will need someone to drive you home  · You will be given more specific instructions about recovering from your procedure, including activity and when you may return to work. · Call your doctor if you have any questions or concerns. After your ablation:    You can expect to feel brief palpitations for up to 3 months. If your palpitations return, please contact [phone]. Patient: ________________________   Date: 3/9/2022      FOLLOW UP VISIT Appointment in: One Month 1) Continue medicines for now. 2) Call office with any questions. 3) Relax (no heavy lifting/working) for next 48-72 hours.  4) Office follow up in 1 month or as needed

## 2022-03-09 NOTE — ANESTHESIA PREPROCEDURE EVALUATION
Relevant Problems   NEUROLOGY   (+) Depression with anxiety      CARDIOVASCULAR   (+) CAD in native artery   (+) HTN (hypertension)   (+) NSTEMI (non-ST elevated myocardial infarction) (HCC)   (+) SVT (supraventricular tachycardia) (HCC)      ENDOCRINE   (+) DM (diabetes mellitus) ([facility] [address]       Anesthetic History     PONV          Review of Systems / Medical History  Patient summary reviewed and pertinent labs reviewed    Pulmonary                   Neuro/Psych              Cardiovascular    Hypertension: well controlled        Dysrhythmias : SVT  Past MI, CAD and cardiac stents    Exercise tolerance: >4 METS     GI/Hepatic/Renal                Endo/Other    Diabetes: type 2    Obesity     Other Findings   Comments: Coffee with cream 0400  Gum 2 hours ago           Physical Exam    Airway  Mallampati: III  TM Distance: > 6 cm  Neck ROM: normal range of motion   Mouth opening: Normal     Cardiovascular  Regular rate and rhythm,  S1 and S2 normal,  no murmur, click, rub, or gallop  Rhythm: regular  Rate: normal         Dental         Pulmonary  Breath sounds clear to auscultation               Abdominal         Other Findings            Anesthetic Plan    ASA: 3  Anesthesia type: total IV anesthesia            Anesthetic plan and risks discussed with: Patient      Cream and Gum should be fine after 10 am

## 2022-03-09 NOTE — PROGRESS NOTES
Assisted OOB & ambulated to BR & on unit. Tolerated activity without difficulty.  bilat groins without bleeding or hematoma

## 2022-03-12 ENCOUNTER — TELEPHONE (OUTPATIENT)
Dept: CARDIOLOGY | Age: 67
End: 2022-03-12

## 2022-03-13 NOTE — TELEPHONE ENCOUNTER
2158 -- Pt called back with update. States that EMS and FD are present at her home. States she \"feels fine\" now. States EMS ran 2 EKGs and they were both \"normal\". States BP is now 144/87 and HR 82. She is not going to ED at this time. Instructed that if s/s return that she should again seek medical evaluation. Pt v/u.        SUKHWINDER [facility]

## 2022-03-13 NOTE — TELEPHONE ENCOUNTER
2100 -- spoke with Pt via phone. States that she had an ablation on Wed by Dr. Sherri Saez. Has been feeling ok but tonight she began to feel poorly. States she had a \"splitting\" HA, nausea and diaphoresis. No CP/SOB. Around 2045 -- she checked her VS -- BP was 151/90 HR 80. She got up and repeated her VS -- BP was 145/88, HR 82. She sat down and checked again -- BP was 155/95, HR 77. States BP is higher than usually for her. She states that she took her Losartan (100mg) and Toprol XL (25mg) this AM as usual. States that she had an MI in August (2021) and received a stent. States no missed ASA/Plavix. Discussed options -- can go to ED for evaluation and management at any point. She could try to take an additional Toprol now and see if improving BP/HR improves her s/s. Would repeat BP in 1hr or so -- if not improved, s/s worse or not improved or if worsening in the interim -- to ED for evaluation. Pt v/u. Also -- ok to take Tylenol of the ESPITIA. Darilyn Bosworth, NP-C      2115 -- while writing above note -- Pt called back and states that she is now having pain between her shoulder blades and across her chest. Instructed that she should go to ED now for evaluation. Discussed someone needs to drive her there or she needs to call 911. Encouraged 911 as they can treat her in route with meds. Pt v/u and will call ambulance now.     Darilyn Bosworth, NP-C

## 2022-03-18 PROBLEM — Z72.0 TOBACCO ABUSE: Status: ACTIVE | Noted: 2021-08-08

## 2022-03-19 PROBLEM — I25.10 CAD IN NATIVE ARTERY: Status: ACTIVE | Noted: 2021-08-16

## 2022-03-19 PROBLEM — E78.5 HYPERLIPIDEMIA: Status: ACTIVE | Noted: 2021-08-08

## 2022-03-19 PROBLEM — I25.5 ISCHEMIC CARDIOMYOPATHY: Status: ACTIVE | Noted: 2021-08-16

## 2022-03-19 PROBLEM — F41.8 DEPRESSION WITH ANXIETY: Status: ACTIVE | Noted: 2021-08-08

## 2022-03-19 PROBLEM — E66.9 OBESITY (BMI 30-39.9): Status: ACTIVE | Noted: 2021-08-16

## 2022-03-19 PROBLEM — I10 HTN (HYPERTENSION): Status: ACTIVE | Noted: 2021-08-08

## 2022-03-19 PROBLEM — E87.6 HYPOKALEMIA: Status: ACTIVE | Noted: 2021-08-08

## 2022-03-19 PROBLEM — I47.1 SVT (SUPRAVENTRICULAR TACHYCARDIA) (HCC): Status: ACTIVE | Noted: 2021-10-27

## 2022-03-19 PROBLEM — I47.10 SVT (SUPRAVENTRICULAR TACHYCARDIA): Status: ACTIVE | Noted: 2021-10-27

## 2022-03-20 PROBLEM — I21.4 NSTEMI (NON-ST ELEVATED MYOCARDIAL INFARCTION) (HCC): Status: ACTIVE | Noted: 2021-08-09

## 2022-03-20 PROBLEM — I87.2 CHRONIC VENOUS INSUFFICIENCY: Status: ACTIVE | Noted: 2021-10-27

## 2022-03-20 PROBLEM — E11.9 DM (DIABETES MELLITUS) (HCC): Status: ACTIVE | Noted: 2021-08-08

## 2022-05-04 ENCOUNTER — TRANSCRIBE ORDER (OUTPATIENT)
Dept: SCHEDULING | Age: 67
End: 2022-05-04

## 2022-05-04 DIAGNOSIS — Z12.31 VISIT FOR SCREENING MAMMOGRAM: Primary | ICD-10-CM

## 2022-05-18 PROBLEM — I49.3 ASYMPTOMATIC PVCS: Status: ACTIVE | Noted: 2022-05-18

## 2022-05-18 PROBLEM — I10 HYPERTENSION: Status: ACTIVE | Noted: 2021-08-08

## 2022-05-18 PROBLEM — Z98.890 HISTORY OF CARDIAC RADIOFREQUENCY ABLATION: Status: ACTIVE | Noted: 2022-05-18

## 2022-06-02 ENCOUNTER — TELEPHONE (OUTPATIENT)
Dept: CARDIOLOGY CLINIC | Age: 67
End: 2022-06-02

## 2022-06-02 ENCOUNTER — OFFICE VISIT (OUTPATIENT)
Dept: CARDIOLOGY CLINIC | Age: 67
End: 2022-06-02
Payer: COMMERCIAL

## 2022-06-02 VITALS
DIASTOLIC BLOOD PRESSURE: 64 MMHG | HEART RATE: 82 BPM | BODY MASS INDEX: 40.05 KG/M2 | WEIGHT: 234.6 LBS | HEIGHT: 64 IN | SYSTOLIC BLOOD PRESSURE: 134 MMHG

## 2022-06-02 DIAGNOSIS — I49.3 PVC (PREMATURE VENTRICULAR CONTRACTION): Primary | ICD-10-CM

## 2022-06-02 DIAGNOSIS — I25.5 ISCHEMIC CARDIOMYOPATHY: ICD-10-CM

## 2022-06-02 DIAGNOSIS — I25.10 CAD IN NATIVE ARTERY: ICD-10-CM

## 2022-06-02 DIAGNOSIS — Z98.890 HISTORY OF CARDIAC RADIOFREQUENCY ABLATION: ICD-10-CM

## 2022-06-02 DIAGNOSIS — R00.2 PALPITATIONS: ICD-10-CM

## 2022-06-02 PROCEDURE — 1123F ACP DISCUSS/DSCN MKR DOCD: CPT | Performed by: INTERNAL MEDICINE

## 2022-06-02 PROCEDURE — 99214 OFFICE O/P EST MOD 30 MIN: CPT | Performed by: INTERNAL MEDICINE

## 2022-06-02 PROCEDURE — 93000 ELECTROCARDIOGRAM COMPLETE: CPT | Performed by: INTERNAL MEDICINE

## 2022-06-02 NOTE — TELEPHONE ENCOUNTER
Pt called stating her heart was out of rhythm last night, there was no pain she just felt weird and it was very irregular. States it is regular this morning.

## 2022-06-02 NOTE — PROGRESS NOTES
[facility] CARDIOLOGY Follow Up                 Reason for Visit: Palpitations    Subjective:     Patient is a 78 y.o. female with a PMH of PMH of AT status post ablation, CAD status post PCI, ICM, hyperlipidemia, and diabetes who presents for follow-up. The patient was last seen on May 18. Toprol-XL was increased to 50 mg daily in the setting of hypertension and asymptomatic PVCs. An echocardiogram was ordered. She is scheduled for an echocardiogram on . The patient patient reports an \"irregularly irregular\" heart rhythm for 1 hour last night. The patient noted the rhythm when using her stethoscope and examining her heart. She denies angina and dyspnea.        Past Medical History:   Diagnosis Date    Arrhythmia     Depression with anxiety 2021    DM (diabetes mellitus) ([facility] [address] 2021    Hypertension     Nausea & vomiting     a long time ago    Other ill-defined conditions(799.89)     elevated cholesterol    STEMI (ST elevation myocardial infarction) ([facility] [address] 2021      Past Surgical History:   Procedure Laterality Date    APPENDECTOMY      BREAST BIOPSY Right     CHOLECYSTECTOMY      GYN      hysterectomy    [place] CARDIAC SURG PROCEDURE UNLIST      Stent       Family History   Problem Relation Age of Onset    Breast Cancer Sister 76    Breast Cancer Sister 62      Social History     Tobacco Use    Smoking status: Current Some Day Smoker     Packs/day: 1.50     Last attempt to quit: 2018     Years since quittin.4    Smokeless tobacco: Never Used    Tobacco comment: Quit smoking: PT VAPES   Substance Use Topics    Alcohol use: No      Allergies   Allergen Reactions    Bupropion Other (See Comments)     Keeps pt awake for days    Codeine Other (See Comments)     Prevents sleep    Hydrocodone-Acetaminophen Other (See Comments)     Tearful    Oxycodone-Acetaminophen Itching    Penicillins Other (See Comments)     Per pt \"not allergic\"    Tramadol Other (See Comments) Prevents sleep          ROS:  No obvious pertinent positives on review of systems except for what was outlined above.        Objective:       /64   Pulse 82   Ht 5' 4\" (1.626 m)   Wt 234 lb 9.6 oz (106.4 kg)   BMI 40.27 kg/m²     BP Readings from Last 3 Encounters:   06/02/22 134/64   05/18/22 (!) 142/90   12/20/21 138/72       Wt Readings from Last 3 Encounters:   06/02/22 234 lb 9.6 oz (106.4 kg)   05/18/22 237 lb 3.2 oz (107.6 kg)   12/20/21 213 lb (96.6 kg)       General/Constitutional:   Alert and oriented x 3, no acute distress  HEENT:   normocephalic, atraumatic, moist mucous membranes  Neck:   No JVD or carotid bruits bilaterally  Cardiovascular:   regular rate and rhythm, no rub/gallop appreciated  Pulmonary:   clear to auscultation bilaterally, no respiratory distress  Abdomen:   soft, non-tender, non-distended  Ext:   No sig LE edema bilaterally  Skin:  warm and dry, no obvious rashes seen  Neuro:   no obvious sensory or motor deficits  Psychiatric:   normal mood and affect    ECG:   Sinus rhythm  Left atrial enlargement  PRWP  Heart rate 82 bpm    Data Review:   Lab Results   Component Value Date    CHOL 125 08/08/2021     Lab Results   Component Value Date    TRIG 275 (H) 08/08/2021     Lab Results   Component Value Date    HDL 29 (L) 08/08/2021     Lab Results   Component Value Date    LDLCALC 41 08/08/2021     Lab Results   Component Value Date    LABVLDL 55 (H) 08/08/2021     Lab Results   Component Value Date    CHOLHDLRATIO 4.3 08/08/2021        Lab Results   Component Value Date     03/07/2022     10/23/2021     08/09/2021    K 3.7 03/07/2022    K 4.0 10/23/2021    K 3.6 08/09/2021     03/07/2022     10/23/2021     08/09/2021    CO2 23 03/07/2022    CO2 28 10/23/2021    CO2 26 08/09/2021    BUN 14 03/07/2022    BUN 16 10/23/2021    BUN 16 08/09/2021    CREATININE 0.80 03/07/2022    CREATININE 0.92 10/23/2021    CREATININE 0.61 08/09/2021    GLUCOSE 136 03/07/2022    GLUCOSE 107 10/23/2021    GLUCOSE 108 08/09/2021    CALCIUM 9.2 03/07/2022    CALCIUM 8.9 10/23/2021    CALCIUM 8.6 08/09/2021         Lab Results   Component Value Date    ALT 29 10/23/2021     (H) 08/07/2021    AST 19 10/23/2021     (H) 08/07/2021        Assessment/Plan:   1. PVC (premature ventricular contraction)  - Continue with Toprol-XL  - Obtain a ZIO for 3 days    2. Palpitations  - Symptomatic PVCs on the differential diagnosis  - Obtain an MPI  - Obtain a ZIO for 3 days  - Currently on Toprol-XL    3. History of cardiac radiofrequency ablation  - AT ablation completed on March 9  - Continue with Toprol-XL    4. CAD in native artery   - Continue with baby aspirin daily, Plavix, Toprol-XL, and pravastatin   - Continue P2Y12 inhibitor therapy for 12 months through August 2022 in the setting of ACS    5.  Ischemic cardiomyopathy  - Follow-up echocardiogram    F/U: 6 months    Dillon Barrett MD

## 2022-06-02 NOTE — TELEPHONE ENCOUNTER
· Did not feel well, stating she \"did not feel right\" with very irregular HR by auscultation for >1 hour, until she fell asleep, last night. · States HR was \"all over the place\" and \"everywhere\", much different than before ablation. · At that time, BP-146/82, HR-86. · Denies chest pain, SOB, dizziness, light headedness, or faint feeling during episode. · Very frightened by episode. · Took ASA 81 mg 4 tabs, during  Episode. · Feels fine with regular HR-80, today. · Taking all meds as listed, below. · Scheduled for echo on 6/16/22 and to see Dr. John Cueva on 8/31/22. Patient asks for Dr. Jovanni Lebron recommendations. She asks if she should see Dr. John Cueva earlier than 8/31/22. Current Outpatient Medications:     metoprolol succinate (TOPROL XL) 25 MG extended release tablet, Take 50 mg by mouth daily, Disp: , Rfl:     ALPRAZolam (XANAX) 1 MG tablet, TAKE 1/2 TABLET BY MOUTH TWICE A DAY AND 2 TABLETS AT BEDTIME AS NEEDED FOR ANXIETY, Disp: , Rfl:     aspirin 81 MG chewable tablet, Take 81 mg by mouth daily, Disp: , Rfl:     clopidogrel (PLAVIX) 75 MG tablet, Take 75 mg by mouth daily, Disp: , Rfl:     cyclobenzaprine (FLEXERIL) 10 MG tablet, Take 10 mg by mouth 2 times daily as needed, Disp: , Rfl:     DULoxetine (CYMBALTA) 60 MG extended release capsule, Take 60 mg by mouth daily, Disp: , Rfl:     gabapentin (NEURONTIN) 600 MG tablet, Take 600 mg by mouth 2 times daily. , Disp: , Rfl:     glipiZIDE (GLUCOTROL XL) 5 MG extended release tablet, Take 5 mg by mouth daily, Disp: , Rfl:     losartan (COZAAR) 100 MG tablet, Take 100 mg by mouth daily, Disp: , Rfl:     metFORMIN (GLUCOPHAGE-XR) 500 MG extended release tablet, Take 1,000 mg by mouth 2 times daily (with meals), Disp: , Rfl:     nitroGLYCERIN (NITROSTAT) 0.4 MG SL tablet, Place 0.4 mg under the tongue, Disp: , Rfl:     pravastatin (PRAVACHOL) 40 MG tablet, Take 40 mg by mouth, Disp: , Rfl:     traZODone (DESYREL) 50 MG tablet, Take 100 mg by mouth, Disp: , Rfl:

## 2022-06-02 NOTE — TELEPHONE ENCOUNTER
Advised patient of Dr. Yeny Short response. Scheduled SA appointment with Dr. Devan Maria today at 11:15 am. Patient verbalized understanding.

## 2022-06-02 NOTE — TELEPHONE ENCOUNTER
Pt returned your call. Stated her landline went out and her cell has poor reception but she would keep an eye on it.

## 2022-06-10 ENCOUNTER — TELEPHONE (OUTPATIENT)
Dept: CARDIOLOGY CLINIC | Age: 67
End: 2022-06-10

## 2022-06-10 RX ORDER — METOPROLOL SUCCINATE 100 MG/1
100 TABLET, EXTENDED RELEASE ORAL DAILY
Qty: 90 TABLET | Refills: 3 | Status: SHIPPED | OUTPATIENT
Start: 2022-06-10 | End: 2022-06-10 | Stop reason: SDUPTHER

## 2022-06-10 RX ORDER — METOPROLOL SUCCINATE 100 MG/1
100 TABLET, EXTENDED RELEASE ORAL DAILY
Qty: 90 TABLET | Refills: 3 | Status: SHIPPED | OUTPATIENT
Start: 2022-06-10 | End: 2022-07-14 | Stop reason: SDUPTHER

## 2022-06-10 NOTE — TELEPHONE ENCOUNTER
----- Message from Jayce Jarquin MD sent at 6/10/2022  1:02 PM EDT -----  Please let the patient know that she was predominantly sinus rhythm. She did not trigger the monitor nor did she keep a diary. The patient did have evidence of nonsustained VT and SVT. She had occasional PVCs. Because of her palpitations, I increased her Toprol-XL to 100 mg daily. Please let her know she can pick it up from her pharmacy.

## 2022-06-10 NOTE — TELEPHONE ENCOUNTER
I called and informed pt. of MD response and she voiced understanding. She would like to see  again. I told her I will send note to 's  to see if she can be worked in.

## 2022-06-13 ENCOUNTER — OFFICE VISIT (OUTPATIENT)
Dept: CARDIOLOGY CLINIC | Age: 67
End: 2022-06-13
Payer: COMMERCIAL

## 2022-06-13 ENCOUNTER — TELEPHONE (OUTPATIENT)
Dept: CARDIOLOGY CLINIC | Age: 67
End: 2022-06-13

## 2022-06-13 VITALS
SYSTOLIC BLOOD PRESSURE: 138 MMHG | HEART RATE: 91 BPM | WEIGHT: 237.8 LBS | BODY MASS INDEX: 40.6 KG/M2 | HEIGHT: 64 IN | DIASTOLIC BLOOD PRESSURE: 86 MMHG

## 2022-06-13 DIAGNOSIS — I47.1 SVT (SUPRAVENTRICULAR TACHYCARDIA) (HCC): Primary | ICD-10-CM

## 2022-06-13 PROCEDURE — 99214 OFFICE O/P EST MOD 30 MIN: CPT | Performed by: INTERNAL MEDICINE

## 2022-06-13 PROCEDURE — 93000 ELECTROCARDIOGRAM COMPLETE: CPT | Performed by: INTERNAL MEDICINE

## 2022-06-13 PROCEDURE — 1123F ACP DISCUSS/DSCN MKR DOCD: CPT | Performed by: INTERNAL MEDICINE

## 2022-06-13 ASSESSMENT — ENCOUNTER SYMPTOMS
GASTROINTESTINAL NEGATIVE: 1
RESPIRATORY NEGATIVE: 1
EYES NEGATIVE: 1
ALLERGIC/IMMUNOLOGIC NEGATIVE: 1

## 2022-06-13 NOTE — PROGRESS NOTES
[facility] CARDIOLOGY  [address] [place] [address], [address] [facility], [address] [place] [address]  PHONE: [phone]        22      NAME:  Madelene Soulier  : 1955  MRN: [id number]     Referring Cardiologist: Leta Ron MD    Reason for Consultation: SVT      ASSESSMENT and PLAN:  Diagnoses and all orders for this visit:      1. SVT (supraventricular tachycardia) ([facility] [address]      2. Hypertension, unspecified type      3. NSTEMI (non-ST elevated myocardial infarction) ([facility] [address]      4. CAD in native artery      5. Ischemic cardiomyopathy      6. Chronic venous insufficiency      7. Tobacco abuse     73year old female with a history of ICM, EF 40-45%, with recent ED visit for pSVT which terminated with adenosine. She was found to have a pAT. She had a successful ablation and has been doing well. She has come in for an urgent follow up for possible recurrent pSVT, very brief in nature. -SVT - pAT from posterosuperior RA. Successful ablation to this point. Brief arrhythmia on her monitor, stable. Not clinically significant. Continue BB.   -Echo.   -Continue metoprolol. Patient has been instructed and agrees to call our office with any issues or other concerns related to their cardiac condition(s) and/or complaint(s). Thank you for allowing me to participate in the electrophysiologic care of Ms. Kaci Glez. Please contact me if any questions or concerns were to arise. Anuel Juares. Elvis CHAMBERLAIN, MS  Clinical Cardiac Electrophysiology  [facility] Cardiology  22  10:10 AM    ===================================================================  Chief Complant:  Atrial fibrillation     Consultation is requested by Dain Anaya MD for evaluation of No chief complaint on file. History:  Madelene Soulier is a most pleasant 78 y.o. female with a past medical and cardiac history significant for CAD status post PCI, ICM EF 40-45% with inferior scar, hyperlipidemia, and diabetes  who presents for EP evaluation.  She is referred by Dr. Delfino Vargas. The patient underwent LHC for a late presentation MI in August, see below. The patient visited the emergency department on October 23 for near syncope in the setting of an ear ache. The  patient was found to have SVT. Adenosine was given and the patient returned to sinus rhythm. Metoprolol was increased to 50 mg daily. The patient had an echocardiogram in August 2021 that was noted to demonstrate an EF of 40 to 45%. Mild AI and  mild MR were noted. She denies chest pain and dyspnea. The patient says she feels \"really tired, a feeling of doom, tearfulness, and easily annoyed\". The patient reports dizziness with turning her head. She also reports nausea with metoprolol as  well as a dry mouth. She comes in for follow up. She had followed up with Dr. Delfino Vargas and repeated a monitor which showed brief episodes of pSVT and brief NSVT. She reports feeling \"emotional.\" She also has some rhinitis/seasonal allergies she is coping with. The patient otherwise denies chest pain, dyspnea, presyncope, syncope or lateralizing  symptoms. Cardiac PMH: (Old records have been reviewed and summarized below)      AT ablation 3/9/2022               EKG:  (EKG has been independently visualized by me with interpretation below): NSR, normal axis, no ischemia. Monitor: Ziopatch, c/w runs of SVT for up to 1-2 days. HR around 140 bpm.      ECHO: 8/2021  ·   LV: Estimated LVEF is 40 - 45%. Normal cavity size, wall thickness and diastolic function. Low normal systolic function. ·   Wall Scoring: The following segments are akinetic: basal inferoseptal, basal inferior, mid inferoseptal and mid inferior. The following segments are hypokinetic: mid inferolateral and apical inferior. All other segments are normal.  ·   Right Ventricle: Normal cavity size and global systolic function. ·   AV: Probably trileaflet aortic valve. Mild aortic valve regurgitation is present.   ·   MV: Mild mitral valve regurgitation is present. ·   IVC: Severely elevated central venous pressure (15 mmHg); IVC diameter is larger than 21 mm and collapses less than 50% with respiration. ·   Image quality for this study was technically difficult. ·   Contrast used: DEFINITY. Previous Heart Catheterization: 8/2021    Left Main     The vessel was visualized by angiography. The vessel is angiographically normal. 5FR EBU3.0     Left Anterior Descending     The vessel was visualized by angiography. The vessel exhibits minimal luminal irregularities. Left Circumflex     The vessel was visualized by angiography. The vessel exhibits minimal luminal irregularities. Right Coronary Artery     The vessel was visualized by angiography. There is severe disease. 5FR JR5     Prox RCA lesion, 100% stenosed. Lesion is the culprit lesion. The lesion was not previously  treated. Stent     Drug-eluting stent was successfully placed. Supplies used: STENT COR HIREN [id number] -- HIREN RESOLUTE KANE     Post-Intervention Lesion Assessment     The intervention was successful. The guidewire crossed the lesion. There is no pre-intervention CATHY flow. Post-intervention CATHY flow is 3. There were no complications. Intervention to prox RCA. 6FR JR4 guide. Heparin for act greater than 240. BMW wire passed down artery. 2.5 x 12 compliant balloon dilated lesion with return of CATHY 3 flow to vessel. 3.5 x 18 Kane stent placed. Post dilated  with 3.5 x 15 mm NC balloon at high pressure. 200 mcg IC NTG given for some spasm. Loaded with Brilinta on table. Short root caused us to switch from initial radial approach to femoral, TR band for fem site, Angioseal for femoral site. Supplies used: STENT COR HIREN [id number] -- HIREN RESOLUTE KANE     There is a 0% residual stenosis post intervention.            Stress Test: n/a       DEVICE INTERROGATION: n/a     Past Medical History, Past Surgical History, Family history, Social History, and Medications were all reviewed with the patient today and updated as necessary. Current Outpatient Medications   Medication Sig Dispense Refill    metoprolol succinate (TOPROL XL) 100 MG extended release tablet Take 1 tablet by mouth daily 90 tablet 3    ALPRAZolam (XANAX) 1 MG tablet TAKE 1/2 TABLET BY MOUTH TWICE A DAY AND 2 TABLETS AT BEDTIME AS NEEDED FOR ANXIETY      aspirin 81 MG chewable tablet Take 81 mg by mouth daily      clopidogrel (PLAVIX) 75 MG tablet Take 75 mg by mouth daily      cyclobenzaprine (FLEXERIL) 10 MG tablet Take 10 mg by mouth 2 times daily as needed      DULoxetine (CYMBALTA) 60 MG extended release capsule Take 60 mg by mouth daily      gabapentin (NEURONTIN) 600 MG tablet Take 600 mg by mouth 2 times daily.  glipiZIDE (GLUCOTROL XL) 5 MG extended release tablet Take 5 mg by mouth daily      losartan (COZAAR) 100 MG tablet Take 100 mg by mouth daily      metFORMIN (GLUCOPHAGE-XR) 500 MG extended release tablet Take 1,000 mg by mouth 2 times daily (with meals)      nitroGLYCERIN (NITROSTAT) 0.4 MG SL tablet Place 0.4 mg under the tongue      pravastatin (PRAVACHOL) 40 MG tablet Take 40 mg by mouth      traZODone (DESYREL) 50 MG tablet Take 100 mg by mouth       No current facility-administered medications for this visit.      Allergies   Allergen Reactions    Bupropion Other (See Comments)     Keeps pt awake for days    Codeine Other (See Comments)     Prevents sleep    Hydrocodone-Acetaminophen Other (See Comments)     Tearful    Oxycodone-Acetaminophen Itching    Penicillins Other (See Comments)     Per pt \"not allergic\"    Tramadol Other (See Comments)     PT STATES NO LONGER ALLERGIC       Past Medical History:   Diagnosis Date    Arrhythmia     Depression with anxiety 8/8/2021    DM [facility]diabetes mellitus) ([facility] [address] 8/8/2021    Hypertension     Nausea & vomiting     a long time ago    Other ill-defined conditions(854.69)     elevated cholesterol    STEMI (ST elevation myocardial infarction) ([facility] [address] 2021     Past Surgical History:   Procedure Laterality Date    APPENDECTOMY      BREAST BIOPSY Right     CHOLECYSTECTOMY      GYN      hysterectomy    [place] CARDIAC SURG PROCEDURE UNLIST      Stent      Family History   Problem Relation Age of Onset    Breast Cancer Sister 76    Breast Cancer Sister 62     Social History     Tobacco Use    Smoking status: Current Some Day Smoker     Packs/day: 1.50     Last attempt to quit: 2018     Years since quittin.4    Smokeless tobacco: Never Used    Tobacco comment: Quit smoking: PT VAPES   Substance Use Topics    Alcohol use: No       ROS:  A comprehensive review of systems was performed with the pertinent positives and negatives as noted in the HPI in addition to:  Review of Systems   Constitutional: Negative. HENT: Negative. Eyes: Negative. Respiratory: Negative. Cardiovascular: Negative. Gastrointestinal: Negative. Endocrine: Negative. Genitourinary: Negative. Musculoskeletal: Negative. Skin: Negative. Allergic/Immunologic: Negative. Neurological: Negative. Hematological: Negative. Psychiatric/Behavioral: Negative. All other systems reviewed and are negative. PHYSICAL EXAM:   /86   Pulse 91   Ht 5' 4\" (1.626 m)   Wt 237 lb 12.8 oz (107.9 kg)   BMI 40.82 kg/m²      Wt Readings from Last 3 Encounters:   22 237 lb 12.8 oz (107.9 kg)   06/10/22 234 lb (106.1 kg)   22 234 lb 9.6 oz (106.4 kg)     BP Readings from Last 3 Encounters:   22 138/86   06/10/22 (!) 148/83   22 134/64       Gen: Well appearing, well developed, no acute distress  Eyes: Pupils equal, round.  Extraocular movements are intact  ENT: Oropharynx clear, no oral lesions, normal dentition  CV: S1S2, regular rate and rhythm, no murmurs, rubs or gallops, normal JVD, no carotid bruits, normal distal pulses, no MARIA [place] ROSARIO  Pulm: Clear to auscultation bilaterally, no accessory muscle uses, no wheezes or rales  GI: Soft, NT, ND, +BS  Neuro: Alert and oriented, nonfocal  Psych: Appropriate affect  Skin: Normal color and skin turgor  MSK: Normal muscle bulk and tone    Medical problems and test results were reviewed with the patient today. No results found for any visits on 06/13/22.

## 2022-06-13 NOTE — TELEPHONE ENCOUNTER
----- Message from Graham Yang MD sent at 6/13/2022  7:57 AM EDT -----  Please let the patient know the stress test was negative for myocardial ischemia.

## 2022-06-13 NOTE — TELEPHONE ENCOUNTER
----- Message from Levon Goff MD sent at 6/13/2022  7:57 AM EDT -----  Please let the patient know the stress test was negative for myocardial ischemia.

## 2022-06-14 ENCOUNTER — TELEPHONE (OUTPATIENT)
Dept: CARDIOLOGY CLINIC | Age: 67
End: 2022-06-14

## 2022-06-14 NOTE — TELEPHONE ENCOUNTER
Informed pt of Dr. Jovanni Lebron response. Confirmed 6/16/22 SA Sheela. Pt voiced understanding and thanks.  cgh

## 2022-06-17 ENCOUNTER — TELEPHONE (OUTPATIENT)
Dept: CARDIOLOGY CLINIC | Age: 67
End: 2022-06-17

## 2022-06-17 NOTE — TELEPHONE ENCOUNTER
Pt notified of test results. Questions and concerns addressed. Pt inst to call for additional needs. Pt verb understanding.

## 2022-06-17 NOTE — TELEPHONE ENCOUNTER
----- Message from Adriano Calderon MD sent at 6/17/2022 12:41 PM EDT -----  Please let the patient know that her EF is about the same in the mid range. No new changes to medical therapy at this time.

## 2022-07-13 ENCOUNTER — TELEPHONE (OUTPATIENT)
Dept: CARDIOLOGY CLINIC | Age: 67
End: 2022-07-13

## 2022-07-14 RX ORDER — METOPROLOL SUCCINATE 100 MG/1
100 TABLET, EXTENDED RELEASE ORAL DAILY
Qty: 90 TABLET | Refills: 3 | Status: SHIPPED | OUTPATIENT
Start: 2022-07-14

## 2022-07-14 NOTE — TELEPHONE ENCOUNTER
Need Rx for Metoprolol 100 mg called in to [facility] for 90 day
Recent rx written for metoprolol 100 mg sent to pt's local pharmacy.  New rx escribed to [facility] for cost reduction per pt request.
normal...

## 2022-07-15 NOTE — PROCEDURE: LIQUID NITROGEN (COSMETIC)
Render Post-Care Instructions In Note?: yes
Consent: The patient's consent was obtained including but not limited to risks of crusting, scabbing, blistering, scarring, darker or lighter pigmentary change, recurrence, incomplete removal and infection. The patient understands that the procedure is cosmetic in nature and is not covered by insurance.
Detail Level: Detailed
Post-Care Instructions: I reviewed with the patient in detail post-care instructions. Patient is to wear sunprotection, and avoid picking at any of the treated lesions. Pt may apply Vaseline to crusted or scabbing areas.
No

## 2022-08-03 ENCOUNTER — HOSPITAL ENCOUNTER (OUTPATIENT)
Dept: MAMMOGRAPHY | Age: 67
Discharge: HOME | End: 2022-08-06
Payer: MEDICARE

## 2022-08-03 DIAGNOSIS — Z12.31 VISIT FOR SCREENING MAMMOGRAM: ICD-10-CM

## 2022-08-03 PROCEDURE — 77063 BREAST TOMOSYNTHESIS BI: CPT

## 2022-11-17 ENCOUNTER — OFFICE VISIT (OUTPATIENT)
Dept: CARDIOLOGY CLINIC | Age: 67
End: 2022-11-17
Payer: MEDICARE

## 2022-11-17 VITALS
BODY MASS INDEX: 40.46 KG/M2 | WEIGHT: 237 LBS | SYSTOLIC BLOOD PRESSURE: 138 MMHG | HEIGHT: 64 IN | HEART RATE: 80 BPM | DIASTOLIC BLOOD PRESSURE: 78 MMHG

## 2022-11-17 DIAGNOSIS — I25.118 CORONARY ARTERY DISEASE OF NATIVE ARTERY OF NATIVE HEART WITH STABLE ANGINA PECTORIS (HCC): Primary | ICD-10-CM

## 2022-11-17 DIAGNOSIS — I47.1 SVT (SUPRAVENTRICULAR TACHYCARDIA) (HCC): ICD-10-CM

## 2022-11-17 PROCEDURE — 4004F PT TOBACCO SCREEN RCVD TLK: CPT | Performed by: INTERNAL MEDICINE

## 2022-11-17 PROCEDURE — 1090F PRES/ABSN URINE INCON ASSESS: CPT | Performed by: INTERNAL MEDICINE

## 2022-11-17 PROCEDURE — G8400 PT W/DXA NO RESULTS DOC: HCPCS | Performed by: INTERNAL MEDICINE

## 2022-11-17 PROCEDURE — 1123F ACP DISCUSS/DSCN MKR DOCD: CPT | Performed by: INTERNAL MEDICINE

## 2022-11-17 PROCEDURE — G8484 FLU IMMUNIZE NO ADMIN: HCPCS | Performed by: INTERNAL MEDICINE

## 2022-11-17 PROCEDURE — 3078F DIAST BP <80 MM HG: CPT | Performed by: INTERNAL MEDICINE

## 2022-11-17 PROCEDURE — 3074F SYST BP LT 130 MM HG: CPT | Performed by: INTERNAL MEDICINE

## 2022-11-17 PROCEDURE — 99214 OFFICE O/P EST MOD 30 MIN: CPT | Performed by: INTERNAL MEDICINE

## 2022-11-17 PROCEDURE — G8417 CALC BMI ABV UP PARAM F/U: HCPCS | Performed by: INTERNAL MEDICINE

## 2022-11-17 PROCEDURE — G8428 CUR MEDS NOT DOCUMENT: HCPCS | Performed by: INTERNAL MEDICINE

## 2022-11-17 PROCEDURE — 93000 ELECTROCARDIOGRAM COMPLETE: CPT | Performed by: INTERNAL MEDICINE

## 2022-11-17 PROCEDURE — 3017F COLORECTAL CA SCREEN DOC REV: CPT | Performed by: INTERNAL MEDICINE

## 2022-11-17 ASSESSMENT — ENCOUNTER SYMPTOMS
RESPIRATORY NEGATIVE: 1
GASTROINTESTINAL NEGATIVE: 1
EYES NEGATIVE: 1
ALLERGIC/IMMUNOLOGIC NEGATIVE: 1

## 2022-11-17 NOTE — PROGRESS NOTES
[facility] CARDIOLOGY  [address] [place] [address], [address] [place] [address]  PHONE: [phone]        22      NAME:  Abilio Edwards  : 1955  MRN: [id number]     Referring Cardiologist: Florian Palacios MD    Reason for Consultation: SVT      ASSESSMENT and PLAN:  Diagnoses and all orders for this visit:      1. SVT (supraventricular tachycardia) ([facility] [address]      2. Hypertension, unspecified type      3. NSTEMI (non-ST elevated myocardial infarction) ([address]      4. CAD in native artery      5. Ischemic cardiomyopathy      6. Chronic venous insufficiency      7. Tobacco abuse     78year old female with a history of ICM, EF 40-45%, with recent ED visit for pSVT which terminated with adenosine. She was found to have a pAT. She had a successful ablation and has been doing well. She has come in for an urgent follow up for possible recurrent pSVT, very brief in nature. -SVT - pAT from posterosuperior RA. Successful ablation to this point. Brief arrhythmia on her monitor, stable. Not clinically significant. Continue BB.   -Echo slightly improved EF from 40-45% to 45-50%. -Continue metoprolol. -CAD - s/p PCI 2022, continue OMT,  will d/c plavix now as per Dr. Chang Big note in 2022.   -Depression - sees psychiatry with Dr. Yariel Franz.   -Routine cardiac care per Dr. Kaley Tyler. -EP follow up in 1 year or PRN. Patient has been instructed and agrees to call our office with any issues or other concerns related to their cardiac condition(s) and/or complaint(s). Thank you for allowing me to participate in the electrophysiologic care of Ms. Kaci Glez. Please contact me if any questions or concerns were to arise. Shirley Leal MD, MS  Clinical Cardiac Electrophysiology  [facility] Cardiology  22  12:13 PM    ===================================================================  Chief Complant:  Atrial fibrillation     Consultation is requested by No ref.  provider found for evaluation of Tachycardia (/) and 6 Month Follow-Up    History:  Gerardo Case is a most pleasant 78 y.o. female with a past medical and cardiac history significant for CAD status post PCI, ICM EF 40-45% with inferior scar, hyperlipidemia, and diabetes  who presents for EP evaluation. She is referred by Dr. Jimbo Ledesma. The patient underwent LHC for a late presentation MI in August, see below. The patient visited the emergency department on October 23 for near syncope in the setting of an ear ache. The  patient was found to have SVT. Adenosine was given and the patient returned to sinus rhythm. Metoprolol was increased to 50 mg daily. The patient had an echocardiogram in August 2021 that was noted to demonstrate an EF of 40 to 45%. Mild AI and  mild MR were noted. She denies chest pain and dyspnea. The patient says she feels \"really tired, a feeling of doom, tearfulness, and easily annoyed\". The patient reports dizziness with turning her head. She also reports nausea with metoprolol as  well as a dry mouth. She comes in for follow up. She had followed up with Dr. Jimbo Ledesma and repeated a monitor which showed brief episodes of pSVT and brief NSVT. She reports feeling \"emotional.\" She also has some rhinitis/seasonal allergies she is coping with. The patient otherwise denies chest pain, dyspnea, presyncope, syncope or lateralizing  symptoms. Cardiac PMH: (Old records have been reviewed and summarized below)      AT ablation 3/9/2022               EKG:  (EKG has been independently visualized by me with interpretation below): NSR, SEAN, nonspecific QRS prolongation, NSST changes. Monitor: Ziopatch, c/w runs of SVT for up to 1-2 days. HR around 140 bpm.     ECHO:    Left Ventricle: Low normal left ventricular systolic function with a visually estimated EF of 45 - 50%. Left ventricle size is normal. Normal wall thickness. See diagram for wall motion findings. Abnormal diastolic function.   Mitral Valve: Mild regurgitation.     Left JR4 guide. Heparin for act greater than 240. BMW wire passed down artery. 2.5 x 12 compliant balloon dilated lesion with return of CATHY 3 flow to vessel. 3.5 x 18 [place] stent placed. Post dilated  with 3.5 x 15 mm NC balloon at high pressure. 200 mcg IC NTG given for some spasm. Loaded with Brilinta on table. Short root caused us to switch from initial radial approach to femoral, TR band for fem site, Angioseal for femoral site. Supplies used: STENT COR HIREN 3.52M63IV -- HIREN RESOLUTE KANE     There is a 0% residual stenosis post intervention. Stress Test: n/a       DEVICE INTERROGATION: n/a     Past Medical History, Past Surgical History, Family history, Social History, and Medications were all reviewed with the patient today and updated as necessary. Current Outpatient Medications   Medication Sig Dispense Refill    metoprolol succinate (TOPROL XL) 100 MG extended release tablet Take 1 tablet by mouth daily 90 tablet 3    ALPRAZolam (XANAX) 1 MG tablet TAKE 1/2 TABLET BY MOUTH TWICE A DAY AND 2 TABLETS AT BEDTIME AS NEEDED FOR ANXIETY      aspirin 81 MG chewable tablet Take 81 mg by mouth daily      cyclobenzaprine (FLEXERIL) 10 MG tablet Take 10 mg by mouth 2 times daily as needed      DULoxetine (CYMBALTA) 60 MG extended release capsule Take 90 mg by mouth daily      gabapentin (NEURONTIN) 600 MG tablet Take 600 mg by mouth 2 times daily.  glipiZIDE (GLUCOTROL XL) 5 MG extended release tablet Take 5 mg by mouth daily      losartan (COZAAR) 100 MG tablet Take 100 mg by mouth daily      metFORMIN (GLUCOPHAGE-XR) 500 MG extended release tablet Take 1,000 mg by mouth 2 times daily (with meals)      nitroGLYCERIN (NITROSTAT) 0.4 MG SL tablet Place 0.4 mg under the tongue      pravastatin (PRAVACHOL) 40 MG tablet Take 40 mg by mouth      traZODone (DESYREL) 50 MG tablet Take 100 mg by mouth       No current facility-administered medications for this visit.      Allergies   Allergen Reactions    Bupropion Other (See Comments)     Keeps pt awake for days    Codeine Other (See Comments)     Prevents sleep    Hydrocodone-Acetaminophen Other (See Comments)     Tearful    Oxycodone-Acetaminophen Itching    Penicillins Other (See Comments)     Per pt \"not allergic\"    Tramadol Other (See Comments)     PT STATES NO LONGER ALLERGIC       Past Medical History:   Diagnosis Date    Arrhythmia     Depression with anxiety 2021    DM (diabetes mellitus) ([facility] [address] 2021    Hypertension     Nausea & vomiting     a long time ago    Other ill-defined conditions(799.89)     elevated cholesterol    STEMI (ST elevation myocardial infarction) ([facility] [address] 2021     Past Surgical History:   Procedure Laterality Date    APPENDECTOMY      BREAST BIOPSY Right     CHOLECYSTECTOMY      GYN      hysterectomy    DE CARDIAC SURG PROCEDURE UNLIST      Stent      Family History   Problem Relation Age of Onset    Breast Cancer Sister 76    Breast Cancer Sister 62     Social History     Tobacco Use    Smoking status: Some Days     Packs/day: 1.50     Types: Cigarettes     Last attempt to quit: 2018     Years since quittin.8    Smokeless tobacco: Never    Tobacco comments:     Quit smoking: PT VAPES   Substance Use Topics    Alcohol use: No       ROS:  A comprehensive review of systems was performed with the pertinent positives and negatives as noted in the HPI in addition to:  Review of Systems   Constitutional: Negative. HENT: Negative. Eyes: Negative. Respiratory: Negative. Cardiovascular: Negative. Gastrointestinal: Negative. Endocrine: Negative. Genitourinary: Negative. Musculoskeletal: Negative. Skin: Negative. Allergic/Immunologic: Negative. Neurological: Negative. Hematological: Negative. Psychiatric/Behavioral: Negative. All other systems reviewed and are negative.     PHYSICAL EXAM:   /78   Pulse 80   Ht 5' 4\" (1.626 m)   Wt 237 lb (107.5 kg)   BMI 40.68 kg/m²      Wt Readings from Last 3 Encounters:   11/17/22 237 lb (107.5 kg)   06/16/22 234 lb (106.1 kg)   06/13/22 237 lb 12.8 oz (107.9 kg)     BP Readings from Last 3 Encounters:   11/17/22 138/78   06/13/22 138/86   06/10/22 (!) 148/83     Gen: Well appearing, well developed, no acute distress  Eyes: Pupils equal, round. Extraocular movements are intact  ENT: Oropharynx clear, no oral lesions, normal dentition  CV: S1S2, regular rate and rhythm, no murmurs, rubs or gallops, normal JVD, no carotid bruits, normal distal pulses, no MARIA DEL ROSARIO  Pulm: Clear to auscultation bilaterally, no accessory muscle uses, no wheezes or rales  GI: Soft, NT, ND, +BS  Neuro: Alert and oriented, nonfocal  Psych: Appropriate affect  Skin: Normal color and skin turgor  MSK: Normal muscle bulk and tone    Medical problems and test results were reviewed with the patient today. No results found for any visits on 11/17/22.

## 2023-01-24 ENCOUNTER — TELEPHONE (OUTPATIENT)
Dept: CARDIOLOGY CLINIC | Age: 68
End: 2023-01-24

## 2023-01-24 NOTE — LETTER
[address] [address] [address], [address] [place] [address]  [phone]  _____________________________________      PRE-OP RISK ASSESSMENT    Sue Milan Pack  1955    Kaci PHIPPS Pack is scheduled for Deep Dental Cleaning with Dr. Christen Lopez. Office is asking if she needs to be prescribed any pre-medication prior. Kaci PHIPPS Pack does not need any pre-medication prior to Deep Cleaning.           Thank you    Merced Guzman MD              1/25/2023  4:34 PM

## 2023-02-07 ENCOUNTER — TELEPHONE (OUTPATIENT)
Dept: CARDIOLOGY CLINIC | Age: 68
End: 2023-02-07

## 2023-02-07 NOTE — TELEPHONE ENCOUNTER
I called and informed pt.that she should have enough refills on Metoprolol to last through this coming July. She looked and does have a bottle.

## 2023-02-07 NOTE — TELEPHONE ENCOUNTER
MEDICATION REFILL REQUEST      Name of Medication:  Metoprolol  Dose:  100 mg  Frequency:  take 1 tablet by mouth daily  Quantity:  90   Days' supply:  90      Pharmacy Name/Location:  [address]    Pt only has 1 pill left

## 2023-07-13 ENCOUNTER — TELEPHONE (OUTPATIENT)
Age: 68
End: 2023-07-13

## 2023-08-11 NOTE — PROGRESS NOTES
[facility] CARDIOLOGY Follow Up                 Reason for Visit: Stable ischemic heart disease    Subjective:     Patient is a 80 y.o. female with a PMH of AT status post ablation, CAD status post PCI, ICM, hyperlipidemia, and diabetes who presents for follow-up. The patient was last seen by this writer in 2022. A Zio, MPI, and TTE were ordered. She was noted to be predominantly in sinus rhythm with asymptomatic/nonsustained VT x1 and asymptomatic/nonsustained SVT x1 with occasional PVCs. She had an MPI in 2022 that noted a normal perfusion. The patient had a TTE in 2022 that was noted to demonstrate an EF of 45 to 50% and mild MR. She subsequent followed up a couple times with EP. She wore an another ambulatory ECG monitor in 2023 that was noted to demonstrate predominantly sinus rhythm, asymptomatic/nonsustained SVT, and occasional/asymptomatic PVCs (3.4%). The patient visited the ER on  in [place] and had an ECG that was noted to demonstrate a normal heart rate with frequent PVCs. Past Medical History:   Diagnosis Date    Arrhythmia     Depression with anxiety 2021    DM (diabetes mellitus) ([address]) 2021    Hypertension     Nausea & vomiting     a long time ago    Other ill-defined conditions(799.89)     elevated cholesterol    STEMI (ST elevation myocardial infarction) ([address]) 2021      Past Surgical History:   Procedure Laterality Date    APPENDECTOMY      BREAST BIOPSY Right     CHOLECYSTECTOMY      GYN      hysterectomy    AL CARDIAC SURG PROCEDURE UNLIST      Stent       Family History   Problem Relation Age of Onset    Breast Cancer Sister 76    Breast Cancer Sister 62      Social History     Tobacco Use    Smoking status: Some Days     Packs/day: 1.50     Types: Cigarettes     Last attempt to quit: 2018     Years since quittin.6    Smokeless tobacco: Never    Tobacco comments:     Quit smoking: PT VAPES   Substance Use Topics    Alcohol use:  No

## 2023-08-14 ENCOUNTER — OFFICE VISIT (OUTPATIENT)
Age: 68
End: 2023-08-14
Payer: MEDICARE

## 2023-08-14 VITALS
BODY MASS INDEX: 40.63 KG/M2 | WEIGHT: 238 LBS | HEIGHT: 64 IN | SYSTOLIC BLOOD PRESSURE: 138 MMHG | HEART RATE: 80 BPM | DIASTOLIC BLOOD PRESSURE: 78 MMHG

## 2023-08-14 DIAGNOSIS — E78.5 HYPERLIPIDEMIA, UNSPECIFIED HYPERLIPIDEMIA TYPE: ICD-10-CM

## 2023-08-14 DIAGNOSIS — I49.3 PVC'S (PREMATURE VENTRICULAR CONTRACTIONS): ICD-10-CM

## 2023-08-14 DIAGNOSIS — I25.5 ISCHEMIC CARDIOMYOPATHY: ICD-10-CM

## 2023-08-14 DIAGNOSIS — Z98.890 HISTORY OF CARDIAC RADIOFREQUENCY ABLATION: Primary | ICD-10-CM

## 2023-08-14 DIAGNOSIS — I25.10 CAD IN NATIVE ARTERY: ICD-10-CM

## 2023-08-14 DIAGNOSIS — I10 HYPERTENSION, UNSPECIFIED TYPE: ICD-10-CM

## 2023-08-14 PROCEDURE — 3017F COLORECTAL CA SCREEN DOC REV: CPT | Performed by: INTERNAL MEDICINE

## 2023-08-14 PROCEDURE — G8427 DOCREV CUR MEDS BY ELIG CLIN: HCPCS | Performed by: INTERNAL MEDICINE

## 2023-08-14 PROCEDURE — G8400 PT W/DXA NO RESULTS DOC: HCPCS | Performed by: INTERNAL MEDICINE

## 2023-08-14 PROCEDURE — 3078F DIAST BP <80 MM HG: CPT | Performed by: INTERNAL MEDICINE

## 2023-08-14 PROCEDURE — 99214 OFFICE O/P EST MOD 30 MIN: CPT | Performed by: INTERNAL MEDICINE

## 2023-08-14 PROCEDURE — 1123F ACP DISCUSS/DSCN MKR DOCD: CPT | Performed by: INTERNAL MEDICINE

## 2023-08-14 PROCEDURE — 4004F PT TOBACCO SCREEN RCVD TLK: CPT | Performed by: INTERNAL MEDICINE

## 2023-08-14 PROCEDURE — G8417 CALC BMI ABV UP PARAM F/U: HCPCS | Performed by: INTERNAL MEDICINE

## 2023-08-14 PROCEDURE — 1090F PRES/ABSN URINE INCON ASSESS: CPT | Performed by: INTERNAL MEDICINE

## 2023-08-14 PROCEDURE — 3075F SYST BP GE 130 - 139MM HG: CPT | Performed by: INTERNAL MEDICINE

## 2023-08-14 RX ORDER — BREXPIPRAZOLE 0.5 MG/1
0.5 TABLET ORAL DAILY
COMMUNITY
Start: 2023-07-05

## 2023-08-16 ENCOUNTER — APPOINTMENT (RX ONLY)
Dept: URBAN - METROPOLITAN AREA CLINIC 25 | Facility: CLINIC | Age: 68
Setting detail: DERMATOLOGY
End: 2023-08-16

## 2023-08-16 DIAGNOSIS — Z87.2 PERSONAL HISTORY OF DISEASES OF THE SKIN AND SUBCUTANEOUS TISSUE: ICD-10-CM

## 2023-08-16 DIAGNOSIS — D22 MELANOCYTIC NEVI: ICD-10-CM

## 2023-08-16 DIAGNOSIS — Z71.89 OTHER SPECIFIED COUNSELING: ICD-10-CM

## 2023-08-16 DIAGNOSIS — Z80.8 FAMILY HISTORY OF MALIGNANT NEOPLASM OF OTHER ORGANS OR SYSTEMS: ICD-10-CM

## 2023-08-16 DIAGNOSIS — D18.0 HEMANGIOMA: ICD-10-CM

## 2023-08-16 DIAGNOSIS — L30.4 ERYTHEMA INTERTRIGO: ICD-10-CM

## 2023-08-16 DIAGNOSIS — L57.8 OTHER SKIN CHANGES DUE TO CHRONIC EXPOSURE TO NONIONIZING RADIATION: ICD-10-CM

## 2023-08-16 DIAGNOSIS — L82.0 INFLAMED SEBORRHEIC KERATOSIS: ICD-10-CM

## 2023-08-16 DIAGNOSIS — D485 NEOPLASM OF UNCERTAIN BEHAVIOR OF SKIN: ICD-10-CM

## 2023-08-16 PROBLEM — D48.5 NEOPLASM OF UNCERTAIN BEHAVIOR OF SKIN: Status: ACTIVE | Noted: 2023-08-16

## 2023-08-16 PROBLEM — D22.5 MELANOCYTIC NEVI OF TRUNK: Status: ACTIVE | Noted: 2023-08-16

## 2023-08-16 PROBLEM — D18.01 HEMANGIOMA OF SKIN AND SUBCUTANEOUS TISSUE: Status: ACTIVE | Noted: 2023-08-16

## 2023-08-16 PROCEDURE — 11102 TANGNTL BX SKIN SINGLE LES: CPT | Mod: 59

## 2023-08-16 PROCEDURE — ? BIOPSY BY SHAVE METHOD

## 2023-08-16 PROCEDURE — 17110 DESTRUCTION B9 LES UP TO 14: CPT

## 2023-08-16 PROCEDURE — ? COUNSELING

## 2023-08-16 PROCEDURE — ? TREATMENT REGIMEN

## 2023-08-16 PROCEDURE — 99213 OFFICE O/P EST LOW 20 MIN: CPT | Mod: 25

## 2023-08-16 PROCEDURE — ? LIQUID NITROGEN

## 2023-08-16 PROCEDURE — ? OTHER

## 2023-08-16 ASSESSMENT — LOCATION SIMPLE DESCRIPTION DERM
LOCATION SIMPLE: RIGHT UPPER BACK
LOCATION SIMPLE: RIGHT EYEBROW
LOCATION SIMPLE: RIGHT BREAST
LOCATION SIMPLE: LEFT SHOULDER
LOCATION SIMPLE: CHEST
LOCATION SIMPLE: LEFT THIGH
LOCATION SIMPLE: LEFT UPPER BACK
LOCATION SIMPLE: LEFT FOREHEAD
LOCATION SIMPLE: LOWER BACK
LOCATION SIMPLE: ABDOMEN
LOCATION SIMPLE: LEFT FOREARM
LOCATION SIMPLE: RIGHT SHOULDER
LOCATION SIMPLE: RIGHT THIGH

## 2023-08-16 ASSESSMENT — LOCATION ZONE DERM
LOCATION ZONE: TRUNK
LOCATION ZONE: ARM
LOCATION ZONE: FACE
LOCATION ZONE: LEG

## 2023-08-16 ASSESSMENT — LOCATION DETAILED DESCRIPTION DERM
LOCATION DETAILED: LEFT ANTERIOR SHOULDER
LOCATION DETAILED: RIGHT ANTERIOR PROXIMAL THIGH
LOCATION DETAILED: LEFT MEDIAL FOREHEAD
LOCATION DETAILED: RIGHT MID-UPPER BACK
LOCATION DETAILED: SUPERIOR LUMBAR SPINE
LOCATION DETAILED: MIDDLE STERNUM
LOCATION DETAILED: RIGHT LATERAL EYEBROW
LOCATION DETAILED: LEFT VENTRAL PROXIMAL FOREARM
LOCATION DETAILED: LEFT SUPERIOR UPPER BACK
LOCATION DETAILED: LEFT ANTERIOR PROXIMAL THIGH
LOCATION DETAILED: RIGHT MEDIAL BREAST 12-1:00 REGION
LOCATION DETAILED: LEFT POSTERIOR SHOULDER
LOCATION DETAILED: RIGHT POSTERIOR SHOULDER
LOCATION DETAILED: EPIGASTRIC SKIN

## 2023-08-16 NOTE — PROCEDURE: OTHER
Note Text (......Xxx Chief Complaint.): This diagnosis correlates with the
Detail Level: Zone
Other (Free Text): Mother

## 2023-08-16 NOTE — PROCEDURE: LIQUID NITROGEN
Medical Necessity Clause: This procedure was medically necessary because the lesions that were treated were:
Show Topical Anesthesia Variable?: Yes
Spray Paint Technique: No
Detail Level: Detailed
Medical Necessity Information: It is in your best interest to select a reason for this procedure from the list below. All of these items fulfill various CMS LCD requirements except the new and changing color options.
Spray Paint Text: The liquid nitrogen was applied to the skin utilizing a spray paint frosting technique.
Post-Care Instructions: I reviewed with the patient in detail post-care instructions. Patient is to wear sunprotection, and avoid picking at any of the treated lesions. Pt may apply Vaseline to crusted or scabbing areas.
Consent: The patient's consent was obtained including but not limited to risks of crusting, scabbing, blistering, scarring, darker or lighter pigmentary change, recurrence, incomplete removal and infection.

## 2023-08-16 NOTE — PROCEDURE: BIOPSY BY SHAVE METHOD
Detail Level: Detailed
Depth Of Biopsy: dermis
Was A Bandage Applied: Yes
Size Of Lesion In Cm: 0
Biopsy Type: H and E
Biopsy Method: Dermablade
Anesthesia Type: 1% lidocaine with epinephrine
Anesthesia Volume In Cc: 0.5
Hemostasis: Drysol
Wound Care: Petrolatum
Dressing: bandage
Destruction After The Procedure: No
Type Of Destruction Used: Curettage
Curettage Text: The wound bed was treated with curettage after the biopsy was performed.
Cryotherapy Text: The wound bed was treated with cryotherapy after the biopsy was performed.
Electrodesiccation Text: The wound bed was treated with electrodesiccation after the biopsy was performed.
Electrodesiccation And Curettage Text: The wound bed was treated with electrodesiccation and curettage after the biopsy was performed.
Silver Nitrate Text: The wound bed was treated with silver nitrate after the biopsy was performed.
Lab: [id number]
Lab Facility: [id number]
Consent: Written consent was obtained and risks were reviewed including but not limited to scarring, infection, bleeding, scabbing, incomplete removal, nerve damage and allergy to anesthesia.
Post-Care Instructions: I reviewed with the patient in detail post-care instructions. Patient is to keep the biopsy site dry overnight, and then apply bacitracin twice daily until healed. Patient may apply hydrogen peroxide soaks to remove any crusting.
Notification Instructions: Patient will be notified of biopsy results. However, patient instructed to call the office if not contacted within 2 weeks.
Billing Type: Third-Party Bill
Information: Selecting Yes will display possible errors in your note based on the variables you have selected. This validation is only offered as a suggestion for you. PLEASE NOTE THAT THE VALIDATION TEXT WILL BE REMOVED WHEN YOU FINALIZE YOUR NOTE. IF YOU WANT TO FAX A PRELIMINARY NOTE YOU WILL NEED TO TOGGLE THIS TO 'NO' IF YOU DO NOT WANT IT IN YOUR FAXED NOTE.

## 2023-08-16 NOTE — HPI: SKIN LESION
What Type Of Note Output Would You Prefer (Optional)?: Standard Output
How Severe Is Your Skin Lesion?: mild
Has Your Skin Lesion Been Treated?: not been treated
Is This A New Presentation, Or A Follow-Up?: Growth
Additional History: She desires removal

## 2023-08-17 RX ORDER — METOPROLOL SUCCINATE 100 MG/1
100 TABLET, EXTENDED RELEASE ORAL DAILY
Qty: 90 TABLET | Refills: 3 | Status: SHIPPED | OUTPATIENT
Start: 2023-08-17

## 2023-08-17 NOTE — TELEPHONE ENCOUNTER
MEDICATION REFILL REQUEST      Name of Medication:  Metoprolol succ  Dose:  100mg  Frequency:  ?  Quantity:  ?  Days' supply:  ?       Pharmacy Name/Location:  Call pt

## 2023-08-17 NOTE — TELEPHONE ENCOUNTER
Requested Prescriptions     Pending Prescriptions Disp Refills    metoprolol succinate (TOPROL XL) 100 MG extended release tablet 90 tablet 3     Sig: Take 1 tablet by mouth daily

## 2023-08-18 ENCOUNTER — TELEPHONE (OUTPATIENT)
Age: 68
End: 2023-08-18

## 2023-08-18 NOTE — TELEPHONE ENCOUNTER
Requested Prescriptions     Pending Prescriptions Disp Refills    metoprolol succinate (TOPROL XL) 100 MG extended release tablet 30 tablet 0     Sig: Take 1 tablet by mouth daily        Pt needs 30 supply to be sent to [facility]

## 2023-08-18 NOTE — TELEPHONE ENCOUNTER
Patient left message on Rx line to please call her regarding rx she needs for Metoprolol .  States she has called 3 times and now she is out

## 2023-08-22 RX ORDER — METOPROLOL SUCCINATE 100 MG/1
100 TABLET, EXTENDED RELEASE ORAL DAILY
Qty: 30 TABLET | Refills: 0 | Status: SHIPPED | OUTPATIENT
Start: 2023-08-22

## 2023-10-09 NOTE — HPI: FULL BODY SKIN EXAMINATION
What Is The Reason For Today's Visit?: Full Body Skin Examination
What Is The Reason For Today's Visit? (Being Monitored For X): concerning skin lesions on an annual basis
How Severe Are Your Spot(S)?: mild
Anesthesia Type: 1% lidocaine with 1:100,000 epinephrine and a 1:10 solution of 8.4% sodium bicarbonate

## 2023-12-11 RX ORDER — METOPROLOL SUCCINATE 100 MG/1
100 TABLET, EXTENDED RELEASE ORAL DAILY
Qty: 90 TABLET | Refills: 3 | Status: SHIPPED | OUTPATIENT
Start: 2023-12-11

## 2023-12-11 NOTE — TELEPHONE ENCOUNTER
Patient asks for Toprol XL refill to be sent to [facility] on [address] in [place].    Requested Prescriptions     Pending Prescriptions Disp Refills    metoprolol succinate (TOPROL XL) 100 MG extended release tablet 90 tablet 3     Sig: Take 1 tablet by mouth daily     Pended Toprol  mg qd refill, as above, sent to Dr. Akhil Uribe for approval.

## 2023-12-11 NOTE — TELEPHONE ENCOUNTER
MEDICATION REFILL REQUEST    PT IS OUT OF MED      Name of Medication:  Metoprolol  Dose:    Frequency:     Quantity:     Days' supply:             Pharmacy Name/Location:  [facility] on [address]

## 2024-02-12 NOTE — PROGRESS NOTES
[facility] Follow Up                 Reason for Visit: Stable ischemic heart disease    Subjective:     Patient is a 68 y.o. female with a PMH of AT status post ablation, CAD status post PCI, ICM, hyperlipidemia, and diabetes who presents for follow-up.  The patient was last seen in 2023.  She had a TTE in 2022 that was noted to demonstrate an EF of 45 to 50% and mild MR. The patient denies angina and dyspnea.    Past Medical History:   Diagnosis Date    Arrhythmia     Depression with anxiety 2021    DM (diabetes mellitus) ([facility]) 2021    Hypertension     Nausea & vomiting     a long time ago    Other ill-defined conditions(799.89)     elevated cholesterol    STEMI (ST elevation myocardial infarction) ([facility]) 2021      Past Surgical History:   Procedure Laterality Date    APPENDECTOMY      BREAST BIOPSY Right     CHOLECYSTECTOMY      GYN      hysterectomy    ID UNLISTED PROCEDURE CARDIAC SURGERY      Stent       Family History   Problem Relation Age of Onset    Breast Cancer Sister 68    Breast Cancer Sister 57      Social History     Tobacco Use    Smoking status: Some Days     Current packs/day: 0.00     Types: Cigarettes     Last attempt to quit: 2018     Years since quittin.1    Smokeless tobacco: Never    Tobacco comments:     Quit smoking: PT VAPES   Substance Use Topics    Alcohol use: No      Allergies   Allergen Reactions    Bupropion Other (See Comments)     Keeps pt awake for days    Codeine Other (See Comments)     Prevents sleep    Hydrocodone-Acetaminophen Other (See Comments)     Tearful    Oxycodone-Acetaminophen Itching    Penicillins Other (See Comments)     Per pt \"not allergic\"    Tramadol Other (See Comments)     PT STATES NO LONGER ALLERGIC         ROS:  No obvious pertinent positives on review of systems except for what was outlined above.       Objective:       /80   Pulse 80   Ht 1.626 m (5' 4\")   Wt 105.1 kg (231 lb 9.6 oz)   BMI 39.75 kg/m²

## 2024-02-14 ENCOUNTER — OFFICE VISIT (OUTPATIENT)
Age: 69
End: 2024-02-14
Payer: MEDICARE

## 2024-02-14 VITALS
DIASTOLIC BLOOD PRESSURE: 80 MMHG | WEIGHT: 231.6 LBS | HEIGHT: 64 IN | HEART RATE: 80 BPM | SYSTOLIC BLOOD PRESSURE: 138 MMHG | BODY MASS INDEX: 39.54 KG/M2

## 2024-02-14 DIAGNOSIS — Z86.79 HISTORY OF ATRIAL TACHYCARDIA: Primary | ICD-10-CM

## 2024-02-14 DIAGNOSIS — I25.5 ISCHEMIC CARDIOMYOPATHY: ICD-10-CM

## 2024-02-14 DIAGNOSIS — I25.10 CAD IN NATIVE ARTERY: ICD-10-CM

## 2024-02-14 DIAGNOSIS — I10 HYPERTENSION, UNSPECIFIED TYPE: ICD-10-CM

## 2024-02-14 DIAGNOSIS — E78.5 HYPERLIPIDEMIA, UNSPECIFIED HYPERLIPIDEMIA TYPE: ICD-10-CM

## 2024-02-14 PROCEDURE — 99214 OFFICE O/P EST MOD 30 MIN: CPT | Performed by: INTERNAL MEDICINE

## 2024-02-14 PROCEDURE — G8484 FLU IMMUNIZE NO ADMIN: HCPCS | Performed by: INTERNAL MEDICINE

## 2024-02-14 PROCEDURE — 3079F DIAST BP 80-89 MM HG: CPT | Performed by: INTERNAL MEDICINE

## 2024-02-14 PROCEDURE — 3075F SYST BP GE 130 - 139MM HG: CPT | Performed by: INTERNAL MEDICINE

## 2024-02-14 PROCEDURE — G8417 CALC BMI ABV UP PARAM F/U: HCPCS | Performed by: INTERNAL MEDICINE

## 2024-02-14 PROCEDURE — G8427 DOCREV CUR MEDS BY ELIG CLIN: HCPCS | Performed by: INTERNAL MEDICINE

## 2024-02-14 PROCEDURE — 1090F PRES/ABSN URINE INCON ASSESS: CPT | Performed by: INTERNAL MEDICINE

## 2024-02-14 PROCEDURE — 4004F PT TOBACCO SCREEN RCVD TLK: CPT | Performed by: INTERNAL MEDICINE

## 2024-02-14 PROCEDURE — G8400 PT W/DXA NO RESULTS DOC: HCPCS | Performed by: INTERNAL MEDICINE

## 2024-02-14 PROCEDURE — 1123F ACP DISCUSS/DSCN MKR DOCD: CPT | Performed by: INTERNAL MEDICINE

## 2024-02-14 PROCEDURE — 3017F COLORECTAL CA SCREEN DOC REV: CPT | Performed by: INTERNAL MEDICINE

## 2024-03-13 RX ORDER — METOPROLOL SUCCINATE 100 MG/1
100 TABLET, EXTENDED RELEASE ORAL DAILY
Qty: 90 TABLET | Refills: 3 | Status: SHIPPED | OUTPATIENT
Start: 2024-03-13

## 2024-03-13 NOTE — TELEPHONE ENCOUNTER
Chief Complaint   Patient presents with    Well Child     9 mo           Subjective:      History was provided by the father. Chad Leavitt III is a 5 m.o. male who is brought in for this well child visit. 2022  Immunization History   Administered Date(s) Administered    DTaP-IPV/Hib, PENTACEL, (age 6w-4y), IM, 0.5mL 2022, 01/12/2023, 03/17/2023    Hep B, ENGERIX-B, RECOMBIVAX-HB, (age Birth - 22y), IM, 0.5mL 2022, 2022, 03/17/2023    Pneumococcal, PCV-13, PREVNAR 15, (age 6w+), IM, 0.5mL 2022, 01/12/2023, 03/17/2023    Rotavirus, ROTARIX, (age 6w-24w), Oral, 1mL 2022, 01/12/2023     History of previous adverse reactions to immunizations:No    Current Issues:  Current concerns and/or questions on the part of Nilton's father include they saw pediatric neurosurgery and have been released to our care. HU. Follow up on previous concerns:  none    Social Screening:  Current child-care arrangements: in home: primary caregiver is grandmother  Sibling relations: good  Parents working outside of home:  Mother:  Yes  Father:  Yes  Secondhand smoke exposure? No  Changes since last visit: none    Review of Systems:  Nutrition:  formula (Similac with iron) and cup  Formula Ounces/day:  u  Solid Foods:  y  Source of Water:  City/county  Vitamins/Fluoride: no   Difficulties with feeding:no  Elimination:  Normal  yes  Sleep:  6 hours/24 hours  Toxic Exposure:   TB Risk:  High no     Lead:  no  Development:  General Behavior: normal for age, sits without support: yes, pulls to stand: yes, cruises: no, walks: no, uses pincer grasp: yes, takes finger foods: no, plays peek-a-dickerson: yes, shows stranger anxiety: yes, shows object permanence: no and says mama/sammi nonspecif: yes    Anticipatory guidance: Gave CRS handout on well-child issues at this age.        Patient Active Problem List    Diagnosis Date Noted    Benign enlargement of subarachnoid space 06/20/2023    Sickle cell trait Per medical record, Toprol XL was continued at last office visit with Dr. Huynh on 2/14/24. Patient is scheduled for echo on 3/19/24 and FU with Dr. Huynh on 8/22/24.   Requested Prescriptions     Pending Prescriptions Disp Refills    metoprolol succinate (TOPROL XL) 100 MG extended release tablet 90 tablet 3     Sig: Take 1 tablet by mouth daily     Pended Toprol XL refill, as above, sent to Dr. Huynh for approval.

## 2024-03-13 NOTE — TELEPHONE ENCOUNTER
MEDICATION REFILL REQUEST      Name of Medication:  Metoprolol   Dose:  100 mg  Frequency:  daily   Quantity:    Days' supply:  90      Pharmacy Name/Location:  [facility]/ Please call in today

## 2024-03-21 ENCOUNTER — TELEPHONE (OUTPATIENT)
Age: 69
End: 2024-03-21

## 2024-03-21 NOTE — TELEPHONE ENCOUNTER
----- Message from Tha Huynh MD sent at 3/21/2024 10:41 AM EDT -----  Please let the patient know that the heart function is normal on ECHO.

## 2024-03-21 NOTE — TELEPHONE ENCOUNTER
Advised patient of echo results and Dr. Huynh's response. Advised patient to call with any questions or concerns prior to August 2024 appointment. Patient verbalized understanding.

## 2024-06-24 ENCOUNTER — TELEPHONE (OUTPATIENT)
Age: 69
End: 2024-06-24

## 2024-06-24 NOTE — TELEPHONE ENCOUNTER
Patient having Eviction fracture  Bilateral Breast reduction  on TBD with Dr. Grady. Requesting risk assessment and any medication hold.Fax: [phone]

## 2024-06-25 NOTE — TELEPHONE ENCOUNTER
Aracely at [facility] requesting cv risk assessment prior to bilateral breast reduction.  Per Dr. Huynh, pt needs f/u appt for cv risk assessment.   Appt scheduled for 7/18/24 at 8:15 with Dr. Huynh at [facility].   Pt confirms appt date, time, and location.    Triage informed Aracely of above.

## 2024-07-17 NOTE — PROGRESS NOTES
[facility] CARDIOLOGY Follow Up                 Reason for Visit: Cardiac risk assessment prior to noncardiac surgery    Subjective:     Patient is a 69 y.o. female with a PMH of AT status post ablation, CAD status post PCI, hyperlipidemia, and diabetes who presents for follow-up.  The patient was last seen in 2024.  A TTE was ordered.  She had a TTE in 2024 that was noted to demonstrate a low normal EF.  The proposed surgery is breast reduction.  She denies angina.  She can climb two flights of stairs.       Past Medical History:   Diagnosis Date    Arrhythmia     Depression with anxiety 2021    DM (diabetes mellitus) ([facility]) 2021    Hypertension     Nausea & vomiting     a long time ago    Other ill-defined conditions(799.89)     elevated cholesterol    STEMI (ST elevation myocardial infarction) ([facility]) 2021      Past Surgical History:   Procedure Laterality Date    APPENDECTOMY      BREAST BIOPSY Right     CHOLECYSTECTOMY      GYN      hysterectomy    [place] UNLISTED PROCEDURE CARDIAC SURGERY      Stent       Family History   Problem Relation Age of Onset    Breast Cancer Sister 68    Breast Cancer Sister 57      Social History     Tobacco Use    Smoking status: Some Days     Current packs/day: 0.00     Types: Cigarettes     Last attempt to quit: 2018     Years since quittin.5    Smokeless tobacco: Never    Tobacco comments:     Quit smoking: PT VAPES   Substance Use Topics    Alcohol use: No      Allergies   Allergen Reactions    Bupropion Other (See Comments)     Keeps pt awake for days    Codeine Other (See Comments)     Prevents sleep    Hydrocodone-Acetaminophen Other (See Comments)     Tearful    Oxycodone-Acetaminophen Itching    Penicillins Other (See Comments)     Per pt \"not allergic\"    Tramadol Other (See Comments)     PT STATES NO LONGER ALLERGIC         ROS:  No obvious pertinent positives on review of systems except for what was outlined above.       Objective:       BP

## 2024-07-18 ENCOUNTER — OFFICE VISIT (OUTPATIENT)
Age: 69
End: 2024-07-18
Payer: MEDICARE

## 2024-07-18 VITALS
BODY MASS INDEX: 40.94 KG/M2 | HEART RATE: 80 BPM | DIASTOLIC BLOOD PRESSURE: 86 MMHG | HEIGHT: 64 IN | WEIGHT: 239.8 LBS | SYSTOLIC BLOOD PRESSURE: 130 MMHG

## 2024-07-18 DIAGNOSIS — Z01.810 PREOPERATIVE CARDIOVASCULAR EXAMINATION: ICD-10-CM

## 2024-07-18 DIAGNOSIS — I25.10 CAD IN NATIVE ARTERY: ICD-10-CM

## 2024-07-18 DIAGNOSIS — E78.5 HYPERLIPIDEMIA, UNSPECIFIED HYPERLIPIDEMIA TYPE: ICD-10-CM

## 2024-07-18 DIAGNOSIS — Z86.79 HISTORY OF ATRIAL TACHYCARDIA: Primary | ICD-10-CM

## 2024-07-18 PROCEDURE — 4004F PT TOBACCO SCREEN RCVD TLK: CPT | Performed by: INTERNAL MEDICINE

## 2024-07-18 PROCEDURE — G8400 PT W/DXA NO RESULTS DOC: HCPCS | Performed by: INTERNAL MEDICINE

## 2024-07-18 PROCEDURE — 1090F PRES/ABSN URINE INCON ASSESS: CPT | Performed by: INTERNAL MEDICINE

## 2024-07-18 PROCEDURE — 1123F ACP DISCUSS/DSCN MKR DOCD: CPT | Performed by: INTERNAL MEDICINE

## 2024-07-18 PROCEDURE — 99214 OFFICE O/P EST MOD 30 MIN: CPT | Performed by: INTERNAL MEDICINE

## 2024-07-18 PROCEDURE — G8428 CUR MEDS NOT DOCUMENT: HCPCS | Performed by: INTERNAL MEDICINE

## 2024-07-18 PROCEDURE — 3017F COLORECTAL CA SCREEN DOC REV: CPT | Performed by: INTERNAL MEDICINE

## 2024-07-18 PROCEDURE — G8417 CALC BMI ABV UP PARAM F/U: HCPCS | Performed by: INTERNAL MEDICINE

## 2024-07-18 PROCEDURE — 3079F DIAST BP 80-89 MM HG: CPT | Performed by: INTERNAL MEDICINE

## 2024-07-18 PROCEDURE — 3075F SYST BP GE 130 - 139MM HG: CPT | Performed by: INTERNAL MEDICINE

## 2024-07-18 RX ORDER — ATORVASTATIN CALCIUM 40 MG/1
40 TABLET, FILM COATED ORAL DAILY
Qty: 90 TABLET | Refills: 3 | Status: SHIPPED | OUTPATIENT
Start: 2024-07-18

## 2024-08-17 PROBLEM — Z01.810 PREOPERATIVE CARDIOVASCULAR EXAMINATION: Status: RESOLVED | Noted: 2024-07-18 | Resolved: 2024-08-17

## 2024-10-01 RX ORDER — ASPIRIN 325 MG
325 TABLET ORAL 2 TIMES DAILY
COMMUNITY

## 2024-10-01 NOTE — PERIOP NOTE
Patient verified name and .  Order for consent NOT found in EHR and matches case posting; patient verifies procedure.   Type 2 surgery, PHONE assessment complete.  Orders NOT received.  Labs per surgeon: none  Labs per anesthesia protocol: HGB (DOS), SQBS (DOS)    Patient answered medical/surgical history questions at their best of ability. All prior to admission medications documented in EPIC.    Patient instructed to continue taking all prescription medications up to the day of surgery but to take only the following medications the day of surgery according to anesthesia guidelines with a small sip of water:     Aspirin (325mg)  Gabapentin (Neurontin)  Duloxetine (Cymbalta)  Atorvastatin (Lipitor)  Metoprolol (Toprol)         Also, patient is requested to take 2 Tylenol in the morning and then again before bed on the day before surgery. Regular or extra strength may be used.       Patient informed that all vitamins and supplements should be held 7 days prior to surgery and NSAIDS 5 days prior to surgery. Prescription meds to hold:    Losartan- Please do not take day of surgery.   Glipizide (Glucotrol)-Please do not take day of surgery.   Metformin (Glucophage)-Please do not take day of surgery.         Patient instructed on the following:    > Arrive at [facility] Entr[facility], time of arrival to be called the day before by 1700  > NPO after midnight, unless otherwise indicated, including gum, mints, and ice chips  > Responsible adult must drive patient to the hospital, stay during surgery, and patient will need supervision 24 hours after anesthesia  > Use non moisturizing soap in shower the night before surgery and on the morning of surgery  > All piercings must be removed prior to arrival.    > Leave all valuables (money and jewelry) at home but bring insurance card and ID on DOS.   > You may be required to pay a deductible or co-pay on the day of your procedure. You can pre-pay by calling [phone] if your surgery is

## 2024-10-14 ENCOUNTER — ANESTHESIA EVENT (OUTPATIENT)
Dept: SURGERY | Age: 69
End: 2024-10-14
Payer: MEDICARE

## 2024-10-14 RX ORDER — SODIUM CHLORIDE, SODIUM LACTATE, POTASSIUM CHLORIDE, CALCIUM CHLORIDE 600; 310; 30; 20 MG/100ML; MG/100ML; MG/100ML; MG/100ML
INJECTION, SOLUTION INTRAVENOUS CONTINUOUS
Status: CANCELLED | OUTPATIENT
Start: 2024-10-14

## 2024-10-14 RX ORDER — DIPHENHYDRAMINE HYDROCHLORIDE 50 MG/ML
12.5 INJECTION INTRAMUSCULAR; INTRAVENOUS
Status: CANCELLED | OUTPATIENT
Start: 2024-10-14 | End: 2024-10-15

## 2024-10-14 RX ORDER — PROCHLORPERAZINE EDISYLATE 5 MG/ML
5 INJECTION INTRAMUSCULAR; INTRAVENOUS
Status: CANCELLED | OUTPATIENT
Start: 2024-10-14 | End: 2024-10-15

## 2024-10-14 RX ORDER — NALOXONE HYDROCHLORIDE 0.4 MG/ML
INJECTION, SOLUTION INTRAMUSCULAR; INTRAVENOUS; SUBCUTANEOUS PRN
Status: CANCELLED | OUTPATIENT
Start: 2024-10-14

## 2024-10-14 RX ORDER — FENTANYL CITRATE 50 UG/ML
25 INJECTION, SOLUTION INTRAMUSCULAR; INTRAVENOUS EVERY 5 MIN PRN
Status: CANCELLED | OUTPATIENT
Start: 2024-10-14

## 2024-10-14 RX ORDER — HYDROMORPHONE HYDROCHLORIDE 2 MG/ML
0.5 INJECTION, SOLUTION INTRAMUSCULAR; INTRAVENOUS; SUBCUTANEOUS EVERY 5 MIN PRN
Status: CANCELLED | OUTPATIENT
Start: 2024-10-14

## 2024-10-14 RX ORDER — ONDANSETRON 2 MG/ML
4 INJECTION INTRAMUSCULAR; INTRAVENOUS
Status: CANCELLED | OUTPATIENT
Start: 2024-10-14 | End: 2024-10-15

## 2024-10-14 RX ORDER — DEXTROSE MONOHYDRATE 100 MG/ML
INJECTION, SOLUTION INTRAVENOUS CONTINUOUS PRN
Status: CANCELLED | OUTPATIENT
Start: 2024-10-14

## 2024-10-14 RX ORDER — SODIUM CHLORIDE 9 MG/ML
INJECTION, SOLUTION INTRAVENOUS PRN
Status: CANCELLED | OUTPATIENT
Start: 2024-10-14

## 2024-10-14 RX ORDER — OXYCODONE HYDROCHLORIDE 5 MG/1
5 TABLET ORAL
Status: CANCELLED | OUTPATIENT
Start: 2024-10-14 | End: 2024-10-15

## 2024-10-14 RX ORDER — IBUPROFEN 600 MG/1
1 TABLET ORAL PRN
Status: CANCELLED | OUTPATIENT
Start: 2024-10-14

## 2024-10-14 RX ORDER — SODIUM CHLORIDE 0.9 % (FLUSH) 0.9 %
5-40 SYRINGE (ML) INJECTION PRN
Status: CANCELLED | OUTPATIENT
Start: 2024-10-14

## 2024-10-14 RX ORDER — MEPERIDINE HYDROCHLORIDE 25 MG/ML
12.5 INJECTION INTRAMUSCULAR; INTRAVENOUS; SUBCUTANEOUS EVERY 5 MIN PRN
Status: CANCELLED | OUTPATIENT
Start: 2024-10-14

## 2024-10-14 RX ORDER — SODIUM CHLORIDE 0.9 % (FLUSH) 0.9 %
5-40 SYRINGE (ML) INJECTION EVERY 12 HOURS SCHEDULED
Status: CANCELLED | OUTPATIENT
Start: 2024-10-14

## 2024-10-14 NOTE — PERIOP NOTE
Preop department called to notify patient of arrival time for scheduled procedure. Instructions given to   - Arrive at [facility] Entr[facility] [address].  - Remain NPO after midnight, unless otherwise indicated, including gum, mints, and ice chips.   - Have a responsible adult to drive patient to the hospital, stay during surgery, and patient will need supervision 24 hours after anesthesia.   - Use antibacterial soap in shower the night before surgery and on the morning of surgery.       Was patient contacted: yes  Voicemail left:   Numbers contacted: [phone]   Arrival time: 1130

## 2024-10-15 ENCOUNTER — HOSPITAL ENCOUNTER (OUTPATIENT)
Age: 69
Setting detail: OUTPATIENT SURGERY
Discharge: HOME | End: 2024-10-15
Attending: SURGERY | Admitting: SURGERY
Payer: MEDICARE

## 2024-10-15 ENCOUNTER — ANESTHESIA (OUTPATIENT)
Dept: SURGERY | Age: 69
End: 2024-10-15
Payer: MEDICARE

## 2024-10-15 VITALS
RESPIRATION RATE: 18 BRPM | TEMPERATURE: 99 F | HEIGHT: 64 IN | HEART RATE: 76 BPM | BODY MASS INDEX: 42.68 KG/M2 | OXYGEN SATURATION: 98 % | SYSTOLIC BLOOD PRESSURE: 161 MMHG | DIASTOLIC BLOOD PRESSURE: 95 MMHG | WEIGHT: 250 LBS

## 2024-10-15 LAB
GLUCOSE BLD STRIP.AUTO-MCNC: 91 MG/DL (ref 65–100)
SERVICE CMNT-IMP: NORMAL

## 2024-10-15 PROCEDURE — 2580000003 HC RX 258: Performed by: ANESTHESIOLOGY

## 2024-10-15 PROCEDURE — 82962 GLUCOSE BLOOD TEST: CPT

## 2024-10-15 PROCEDURE — 6370000000 HC RX 637 (ALT 250 FOR IP): Performed by: ANESTHESIOLOGY

## 2024-10-15 RX ORDER — SODIUM CHLORIDE 9 MG/ML
INJECTION, SOLUTION INTRAVENOUS PRN
Status: DISCONTINUED | OUTPATIENT
Start: 2024-10-15 | End: 2024-10-15 | Stop reason: HOSPADM

## 2024-10-15 RX ORDER — LIDOCAINE HYDROCHLORIDE 10 MG/ML
1 INJECTION, SOLUTION INFILTRATION; PERINEURAL
Status: DISCONTINUED | OUTPATIENT
Start: 2024-10-15 | End: 2024-10-15 | Stop reason: HOSPADM

## 2024-10-15 RX ORDER — SODIUM CHLORIDE 0.9 % (FLUSH) 0.9 %
5-40 SYRINGE (ML) INJECTION EVERY 12 HOURS SCHEDULED
Status: DISCONTINUED | OUTPATIENT
Start: 2024-10-15 | End: 2024-10-15 | Stop reason: HOSPADM

## 2024-10-15 RX ORDER — SODIUM CHLORIDE 0.9 % (FLUSH) 0.9 %
5-40 SYRINGE (ML) INJECTION PRN
Status: DISCONTINUED | OUTPATIENT
Start: 2024-10-15 | End: 2024-10-15 | Stop reason: HOSPADM

## 2024-10-15 RX ORDER — MIDAZOLAM HYDROCHLORIDE 2 MG/2ML
2 INJECTION, SOLUTION INTRAMUSCULAR; INTRAVENOUS
Status: DISCONTINUED | OUTPATIENT
Start: 2024-10-15 | End: 2024-10-15 | Stop reason: HOSPADM

## 2024-10-15 RX ORDER — SODIUM CHLORIDE, SODIUM LACTATE, POTASSIUM CHLORIDE, CALCIUM CHLORIDE 600; 310; 30; 20 MG/100ML; MG/100ML; MG/100ML; MG/100ML
INJECTION, SOLUTION INTRAVENOUS CONTINUOUS
Status: DISCONTINUED | OUTPATIENT
Start: 2024-10-15 | End: 2024-10-15 | Stop reason: HOSPADM

## 2024-10-15 RX ORDER — ACETAMINOPHEN 500 MG
1000 TABLET ORAL ONCE
Status: COMPLETED | OUTPATIENT
Start: 2024-10-15 | End: 2024-10-15

## 2024-10-15 RX ADMIN — ACETAMINOPHEN 1000 MG: 500 TABLET, FILM COATED ORAL at 11:40

## 2024-10-15 RX ADMIN — SODIUM CHLORIDE, POTASSIUM CHLORIDE, SODIUM LACTATE AND CALCIUM CHLORIDE: 600; 310; 30; 20 INJECTION, SOLUTION INTRAVENOUS at 11:59

## 2024-10-15 ASSESSMENT — PAIN - FUNCTIONAL ASSESSMENT: PAIN_FUNCTIONAL_ASSESSMENT: 0-10

## 2024-10-15 ASSESSMENT — LIFESTYLE VARIABLES: SMOKING_STATUS: 1

## 2024-10-15 NOTE — ANESTHESIA PRE PROCEDURE
PT VAPES      Vital Signs (Current):   Vitals:    10/01/24 1444 10/15/24 1115   BP:  (!) 161/95   Pulse:  76   Resp:  18   Temp:  99 °F (37.2 °C)   TempSrc:  Oral   SpO2:  98%   Weight: 113.4 kg (250 lb) 113.4 kg (250 lb)   Height: 1.626 m (5' 4\")                                               BP Readings from Last 3 Encounters:   10/15/24 (!) 161/95   07/18/24 130/86   03/19/24 (!) 148/88       NPO Status:                                                                                 BMI:   Wt Readings from Last 3 Encounters:   10/15/24 113.4 kg (250 lb)   07/18/24 108.8 kg (239 lb 12.8 oz)   03/19/24 104.8 kg (231 lb)     Body mass index is 42.91 kg/m².    CBC:   Lab Results   Component Value Date/Time    WBC 7.8 03/07/2022 02:25 PM    RBC 5.87 03/07/2022 02:25 PM    HGB 16.5 03/07/2022 02:25 PM    HCT 51.8 03/07/2022 02:25 PM    MCV 88.2 03/07/2022 02:25 PM    RDW 14.7 03/07/2022 02:25 PM     03/07/2022 02:25 PM       CMP:   Lab Results   Component Value Date/Time     03/07/2022 02:25 PM    K 3.7 03/07/2022 02:25 PM     03/07/2022 02:25 PM    CO2 23 03/07/2022 02:25 PM    BUN 14 03/07/2022 02:25 PM    CREATININE 0.80 03/07/2022 02:25 PM    GFRAA >60 03/07/2022 02:25 PM    AGRATIO 0.9 10/23/2021 01:50 PM    GLUCOSE 136 03/07/2022 02:25 PM    CALCIUM 9.2 03/07/2022 02:25 PM    BILITOT 0.2 10/23/2021 01:50 PM    ALKPHOS 45 10/23/2021 01:50 PM    AST 19 10/23/2021 01:50 PM    ALT 29 10/23/2021 01:50 PM       POC Tests: No results for input(s): \"POCGLU\", \"POCNA\", \"POCK\", \"POCCL\", \"POCBUN\", \"POCHEMO\", \"POCHCT\" in the last 72 hours.    Coags:   Lab Results   Component Value Date/Time    PROTIME 12.4 03/07/2022 02:25 PM    INR 0.9 03/07/2022 02:25 PM    APTT 96.6 08/07/2021 06:52 PM       HCG (If Applicable): No results found for: \"PREGTESTUR\", \"PREGSERUM\", \"HCG\", \"HCGQUANT\"     ABGs: No results found for: \"PHART\", \"PO2ART\", \"HBV1ORW\", \"LJT7TLK\", \"BEART\", \"L9YOUHHF\"     Type & Screen (If

## 2024-11-28 NOTE — TELEPHONE ENCOUNTER
Response received from Dr Leal he stated pt does not need pre-medication prior to deep dental cleaning. Sent electronic fax to [phone].  
[facility] Sevilla is requesting clearance as pt is having a Deep Cleaning done on her teeth and the office is questioning if she needs pre meds. Please send response to [phone] per Valeria.
Warm

## 2024-12-26 ENCOUNTER — HOSPITAL ENCOUNTER (OUTPATIENT)
Dept: SURGERY | Age: 69
Discharge: HOME | End: 2024-12-29
Payer: MEDICARE

## 2024-12-26 LAB
EKG ATRIAL RATE: 71 BPM
EKG DIAGNOSIS: NORMAL
EKG P AXIS: 59 DEGREES
EKG P-R INTERVAL: 182 MS
EKG Q-T INTERVAL: 426 MS
EKG QRS DURATION: 88 MS
EKG QTC CALCULATION (BAZETT): 462 MS
EKG R AXIS: 22 DEGREES
EKG T AXIS: -7 DEGREES
EKG VENTRICULAR RATE: 71 BPM
HGB BLD-MCNC: 16.4 G/DL (ref 11.7–15.4)

## 2024-12-26 PROCEDURE — 93005 ELECTROCARDIOGRAM TRACING: CPT | Performed by: ANESTHESIOLOGY

## 2024-12-26 PROCEDURE — 85018 HEMOGLOBIN: CPT

## 2024-12-26 PROCEDURE — 93010 ELECTROCARDIOGRAM REPORT: CPT | Performed by: INTERNAL MEDICINE

## 2025-01-20 NOTE — PROGRESS NOTES
[facility] CARDIOLOGY Follow Up                 Reason for Visit: CCD    Subjective:     Patient is a 69 y.o. female with a PMH of AT status post ablation, CAD status post PCI, hypertension, hyperlipidemia, and diabetes who presents for follow-up.  The patient was last seen in July 2024.  Pravastatin was discontinued.  Lipitor 40 mg daily was started.  She was seen for preoperative evaluation.  She had breast reduction surgery cancelled in January 2025 because of smoking.  She had a TTE in March 2024 that was noted to demonstrate a low normal EF.  She denies angina.      Past Medical History:   Diagnosis Date    Actinic keratosis     Anxiety and depression     medication    Arrhythmia     Arthritis     CAD in native artery     stent    Chronic pain of both knees     Chronic venous insufficiency     DM (diabetes mellitus) ([facility]) 8/8/2021    oral agents, does not check BS, denies hypoglycemia    Erythema intertrigo     H/O heart artery stent     Hearing loss     History of cardiac radiofrequency ablation     tachycardia    Hx of echocardiogram 03/19/2024      Left Ventricle: Normal left ventricular systolic function. EF by 2D Simpsons Biplane is 50%. Left ventricle size is normal. Increased wall thickness. Findings consistent with moderate concentric hypertrophy. Normal wall motion. Normal diastolic function.   Aorta: Normal sized aortic root. Ao root diameter is 2.6 cm.    Hyperlipidemia     Hypertension     medication    Ischemic cardiomyopathy     Lumbar spondylosis     Melanocytic nevi of right upper limb, including shoulder     Multiple lipomas     Nausea & vomiting     a long time ago    Neoplasm of uncertain behavior of skin     Neoplasm of unspecified behavior of bone, soft tissue, and skin     Other ill-defined conditions(799.89)     elevated cholesterol    Overflow stress incontinence of urine in female     Parotid tumor     PVC (premature ventricular contraction)     STEMI (ST elevation myocardial infarction)

## 2025-01-21 ENCOUNTER — OFFICE VISIT (OUTPATIENT)
Age: 70
End: 2025-01-21
Payer: MEDICARE

## 2025-01-21 VITALS
SYSTOLIC BLOOD PRESSURE: 122 MMHG | BODY MASS INDEX: 43.64 KG/M2 | WEIGHT: 255.6 LBS | DIASTOLIC BLOOD PRESSURE: 70 MMHG | HEIGHT: 64 IN | HEART RATE: 68 BPM

## 2025-01-21 DIAGNOSIS — I10 HYPERTENSION, UNSPECIFIED TYPE: ICD-10-CM

## 2025-01-21 DIAGNOSIS — E78.5 HYPERLIPIDEMIA, UNSPECIFIED HYPERLIPIDEMIA TYPE: ICD-10-CM

## 2025-01-21 DIAGNOSIS — I25.10 CAD IN NATIVE ARTERY: ICD-10-CM

## 2025-01-21 DIAGNOSIS — Z98.890 HISTORY OF CARDIAC RADIOFREQUENCY ABLATION: Primary | ICD-10-CM

## 2025-01-21 PROCEDURE — G8400 PT W/DXA NO RESULTS DOC: HCPCS | Performed by: INTERNAL MEDICINE

## 2025-01-21 PROCEDURE — 3074F SYST BP LT 130 MM HG: CPT | Performed by: INTERNAL MEDICINE

## 2025-01-21 PROCEDURE — 1123F ACP DISCUSS/DSCN MKR DOCD: CPT | Performed by: INTERNAL MEDICINE

## 2025-01-21 PROCEDURE — 1126F AMNT PAIN NOTED NONE PRSNT: CPT | Performed by: INTERNAL MEDICINE

## 2025-01-21 PROCEDURE — 1090F PRES/ABSN URINE INCON ASSESS: CPT | Performed by: INTERNAL MEDICINE

## 2025-01-21 PROCEDURE — G8428 CUR MEDS NOT DOCUMENT: HCPCS | Performed by: INTERNAL MEDICINE

## 2025-01-21 PROCEDURE — 3017F COLORECTAL CA SCREEN DOC REV: CPT | Performed by: INTERNAL MEDICINE

## 2025-01-21 PROCEDURE — 99214 OFFICE O/P EST MOD 30 MIN: CPT | Performed by: INTERNAL MEDICINE

## 2025-01-21 PROCEDURE — 4004F PT TOBACCO SCREEN RCVD TLK: CPT | Performed by: INTERNAL MEDICINE

## 2025-01-21 PROCEDURE — G8417 CALC BMI ABV UP PARAM F/U: HCPCS | Performed by: INTERNAL MEDICINE

## 2025-01-21 PROCEDURE — 3078F DIAST BP <80 MM HG: CPT | Performed by: INTERNAL MEDICINE

## 2025-01-21 RX ORDER — ATORVASTATIN CALCIUM 40 MG/1
40 TABLET, FILM COATED ORAL NIGHTLY
Qty: 90 TABLET | Refills: 3 | Status: SHIPPED | OUTPATIENT
Start: 2025-01-21

## 2025-01-21 RX ORDER — METOPROLOL SUCCINATE 100 MG/1
100 TABLET, EXTENDED RELEASE ORAL DAILY
Qty: 90 TABLET | Refills: 3 | Status: SHIPPED | OUTPATIENT
Start: 2025-01-21

## 2025-03-04 RX ORDER — METOPROLOL SUCCINATE 100 MG/1
100 TABLET, EXTENDED RELEASE ORAL DAILY
Qty: 90 TABLET | Refills: 3 | Status: SHIPPED | OUTPATIENT
Start: 2025-03-04

## 2025-03-04 RX ORDER — ATORVASTATIN CALCIUM 40 MG/1
40 TABLET, FILM COATED ORAL NIGHTLY
Qty: 90 TABLET | Refills: 3 | Status: SHIPPED | OUTPATIENT
Start: 2025-03-04

## 2025-03-04 NOTE — TELEPHONE ENCOUNTER
Patient left message on medication refill line requesting All medication's to be called in ( patient did not leave name's of medication ) call in to [facility]

## 2025-03-04 NOTE — TELEPHONE ENCOUNTER
Patient requests Lipitor and Toprol XL refills be sent to [facility] on [facility] in [place]. Per medical record, Lipitor and Toprol XL were continued at last appointment with Dr. Huynh on 1/21/25. Next scheduled appointment with Dr. Huynh is 7/24/25.  Requested Prescriptions     Pending Prescriptions Disp Refills    metoprolol succinate (TOPROL XL) 100 MG extended release tablet 90 tablet 3     Sig: Take 1 tablet by mouth daily    atorvastatin (LIPITOR) 40 MG tablet 90 tablet 3     Sig: Take 1 tablet by mouth nightly     Pended refills, as above, sent to Dr. Huynh for approval.

## 2025-06-24 ENCOUNTER — APPOINTMENT (OUTPATIENT)
Dept: CT IMAGING | Age: 70
End: 2025-06-24
Payer: MEDICARE

## 2025-06-24 ENCOUNTER — HOSPITAL ENCOUNTER (EMERGENCY)
Age: 70
Discharge: HOME | End: 2025-06-24
Attending: EMERGENCY MEDICINE
Payer: MEDICARE

## 2025-06-24 ENCOUNTER — APPOINTMENT (OUTPATIENT)
Dept: GENERAL RADIOLOGY | Age: 70
End: 2025-06-24
Payer: MEDICARE

## 2025-06-24 VITALS
HEART RATE: 68 BPM | TEMPERATURE: 98.6 F | RESPIRATION RATE: 22 BRPM | SYSTOLIC BLOOD PRESSURE: 140 MMHG | OXYGEN SATURATION: 95 % | DIASTOLIC BLOOD PRESSURE: 80 MMHG | BODY MASS INDEX: 41.93 KG/M2 | WEIGHT: 245.6 LBS | HEIGHT: 64 IN

## 2025-06-24 DIAGNOSIS — S06.0X0A CONCUSSION WITHOUT LOSS OF CONSCIOUSNESS, INITIAL ENCOUNTER: ICD-10-CM

## 2025-06-24 DIAGNOSIS — S09.90XA INJURY OF HEAD, INITIAL ENCOUNTER: Primary | ICD-10-CM

## 2025-06-24 DIAGNOSIS — S63.501A SPRAIN OF RIGHT WRIST, INITIAL ENCOUNTER: ICD-10-CM

## 2025-06-24 LAB
ALBUMIN SERPL-MCNC: 3.9 G/DL (ref 3.2–4.6)
ALBUMIN/GLOB SERPL: 1.5 (ref 1–1.9)
ALP SERPL-CCNC: 47 U/L (ref 35–104)
ALT SERPL-CCNC: 21 U/L (ref 12–65)
ANION GAP SERPL CALC-SCNC: 11 MMOL/L (ref 7–16)
AST SERPL-CCNC: 13 U/L (ref 15–37)
BASOPHILS # BLD: 0.04 K/UL (ref 0–0.2)
BASOPHILS NFR BLD: 0.5 % (ref 0–2)
BILIRUB SERPL-MCNC: 0.3 MG/DL (ref 0–1.2)
BUN SERPL-MCNC: 15 MG/DL (ref 8–23)
CALCIUM SERPL-MCNC: 9.2 MG/DL (ref 8.8–10.2)
CHLORIDE SERPL-SCNC: 102 MMOL/L (ref 98–107)
CO2 SERPL-SCNC: 25 MMOL/L (ref 20–29)
CREAT SERPL-MCNC: 1.05 MG/DL (ref 0.8–1.3)
DIFFERENTIAL METHOD BLD: ABNORMAL
EKG ATRIAL RATE: 76 BPM
EKG DIAGNOSIS: NORMAL
EKG P AXIS: 60 DEGREES
EKG P-R INTERVAL: 177 MS
EKG Q-T INTERVAL: 420 MS
EKG QRS DURATION: 107 MS
EKG QTC CALCULATION (BAZETT): 473 MS
EKG R AXIS: 4 DEGREES
EKG T AXIS: -17 DEGREES
EKG VENTRICULAR RATE: 76 BPM
EOSINOPHIL # BLD: 0.14 K/UL (ref 0–0.8)
EOSINOPHIL NFR BLD: 1.7 % (ref 0.5–7.8)
ERYTHROCYTE [DISTWIDTH] IN BLOOD BY AUTOMATED COUNT: 14.8 % (ref 11.9–14.6)
GLOBULIN SER CALC-MCNC: 2.6 G/DL (ref 2.3–3.5)
GLUCOSE SERPL-MCNC: 191 MG/DL (ref 65–100)
HCT VFR BLD AUTO: 49.3 % (ref 35.8–46.3)
HGB BLD-MCNC: 16.1 G/DL (ref 11.7–15.4)
IMM GRANULOCYTES # BLD AUTO: 0.04 K/UL (ref 0–0.5)
IMM GRANULOCYTES NFR BLD AUTO: 0.5 % (ref 0–5)
LYMPHOCYTES # BLD: 2.84 K/UL (ref 0.5–4.6)
LYMPHOCYTES NFR BLD: 34.9 % (ref 13–44)
MCH RBC QN AUTO: 27.8 PG (ref 26.1–32.9)
MCHC RBC AUTO-ENTMCNC: 32.7 G/DL (ref 31.4–35)
MCV RBC AUTO: 85 FL (ref 82–102)
MONOCYTES # BLD: 0.43 K/UL (ref 0.1–1.3)
MONOCYTES NFR BLD: 5.3 % (ref 4–12)
NEUTS SEG # BLD: 4.64 K/UL (ref 1.7–8.2)
NEUTS SEG NFR BLD: 57.1 % (ref 43–78)
NRBC # BLD: 0 K/UL (ref 0–0.2)
PLATELET # BLD AUTO: 187 K/UL (ref 150–450)
PMV BLD AUTO: 9.9 FL (ref 9.4–12.3)
POTASSIUM SERPL-SCNC: 4 MMOL/L (ref 3.5–5.1)
PROT SERPL-MCNC: 6.5 G/DL (ref 6.3–8.2)
RBC # BLD AUTO: 5.8 M/UL (ref 4.05–5.2)
SODIUM SERPL-SCNC: 138 MMOL/L (ref 133–143)
WBC # BLD AUTO: 8.1 K/UL (ref 4.3–11.1)

## 2025-06-24 PROCEDURE — 70450 CT HEAD/BRAIN W/O DYE: CPT

## 2025-06-24 PROCEDURE — 80053 COMPREHEN METABOLIC PANEL: CPT

## 2025-06-24 PROCEDURE — 85025 COMPLETE CBC W/AUTO DIFF WBC: CPT

## 2025-06-24 PROCEDURE — 71046 X-RAY EXAM CHEST 2 VIEWS: CPT

## 2025-06-24 PROCEDURE — 93010 ELECTROCARDIOGRAM REPORT: CPT | Performed by: INTERNAL MEDICINE

## 2025-06-24 PROCEDURE — 2580000003 HC RX 258: Performed by: EMERGENCY MEDICINE

## 2025-06-24 PROCEDURE — 72170 X-RAY EXAM OF PELVIS: CPT

## 2025-06-24 PROCEDURE — 99285 EMERGENCY DEPT VISIT HI MDM: CPT

## 2025-06-24 PROCEDURE — 96360 HYDRATION IV INFUSION INIT: CPT

## 2025-06-24 PROCEDURE — 96361 HYDRATE IV INFUSION ADD-ON: CPT

## 2025-06-24 PROCEDURE — 73110 X-RAY EXAM OF WRIST: CPT

## 2025-06-24 PROCEDURE — 73080 X-RAY EXAM OF ELBOW: CPT

## 2025-06-24 PROCEDURE — 72125 CT NECK SPINE W/O DYE: CPT

## 2025-06-24 PROCEDURE — 93005 ELECTROCARDIOGRAM TRACING: CPT | Performed by: EMERGENCY MEDICINE

## 2025-06-24 RX ORDER — 0.9 % SODIUM CHLORIDE 0.9 %
1000 INTRAVENOUS SOLUTION INTRAVENOUS
Status: DISCONTINUED | OUTPATIENT
Start: 2025-06-24 | End: 2025-06-24

## 2025-06-24 RX ORDER — ONDANSETRON 4 MG/1
4 TABLET, ORALLY DISINTEGRATING ORAL 4 TIMES DAILY PRN
Qty: 21 TABLET | Refills: 0 | Status: SHIPPED | OUTPATIENT
Start: 2025-06-24

## 2025-06-24 RX ORDER — TRAMADOL HYDROCHLORIDE 50 MG/1
50 TABLET ORAL EVERY 6 HOURS PRN
Qty: 19 TABLET | Refills: 0 | Status: SHIPPED | OUTPATIENT
Start: 2025-06-24 | End: 2025-06-27

## 2025-06-24 RX ORDER — MELOXICAM 15 MG/1
15 TABLET ORAL DAILY
Qty: 14 TABLET | Refills: 0 | Status: SHIPPED | OUTPATIENT
Start: 2025-06-24

## 2025-06-24 RX ORDER — 0.9 % SODIUM CHLORIDE 0.9 %
1000 INTRAVENOUS SOLUTION INTRAVENOUS
Status: COMPLETED | OUTPATIENT
Start: 2025-06-24 | End: 2025-06-24

## 2025-06-24 RX ADMIN — SODIUM CHLORIDE 1000 ML: 0.9 INJECTION, SOLUTION INTRAVENOUS at 14:16

## 2025-06-24 ASSESSMENT — ENCOUNTER SYMPTOMS
BLURRED VISION: 0
DIFFICULTY BREATHING: 0
DOUBLE VISION: 0

## 2025-06-24 ASSESSMENT — PAIN - FUNCTIONAL ASSESSMENT: PAIN_FUNCTIONAL_ASSESSMENT: 0-10

## 2025-06-24 ASSESSMENT — PAIN SCALES - GENERAL: PAINLEVEL_OUTOF10: 1

## 2025-06-24 ASSESSMENT — LIFESTYLE VARIABLES
HOW OFTEN DO YOU HAVE A DRINK CONTAINING ALCOHOL: NEVER
HOW MANY STANDARD DRINKS CONTAINING ALCOHOL DO YOU HAVE ON A TYPICAL DAY: PATIENT DOES NOT DRINK

## 2025-06-24 ASSESSMENT — PAIN DESCRIPTION - LOCATION: LOCATION: HEAD;HAND

## 2025-06-24 NOTE — DISCHARGE INSTRUCTIONS
Take medications as directed  Continue all your current medications  Drink plenty of fluids  Get plenty of rest    Call your doctor or the follow up doctor to set up appointment for recheck visit    Do not drink alcohol or drive while taking the prescription pain medications    Return to ER for any worsening symptoms or new problems which may arise

## 2025-06-24 NOTE — ED TRIAGE NOTES
Pt to the ED from home with a slow steady gait after a fall yesterday. Pt states that she had wet feet which caused her to fall. Pt states that she did not have LOC and is on blood thinners. Pt states that she has been dizzy since. Pt has pain to the back on her head and to her right hand.

## 2025-06-24 NOTE — ED NOTES
Patient mobility status ambulates with no difficulty.     I have reviewed discharge instructions with the patient.  The patient verbalized understanding.    Patient left ED via Discharge Method: ambulatory to Home with self.    Opportunity for questions and clarification provided.     Patient given 3 scripts.

## 2025-06-24 NOTE — ED PROVIDER NOTES
myocardial infarction) (HCC) 2021    SVT (supraventricular tachycardia)     Tobacco use     Tremor of both hands     Vapes non-nicotine containing substance     Varicose veins of leg with pain, right         Past Surgical History:   Procedure Laterality Date    APPENDECTOMY  1968    BREAST BIOPSY Right     CARDIAC ELECTROPHYSIOLOGY MAPPING AND ABLATION  2022    CHOLECYSTECTOMY      COLONOSCOPY      HYSTERECTOMY (CERVIX STATUS UNKNOWN)      total abd with bilateral salpingoophorectomy    LIPOMA RESECTION      Dr. Oneal Ambriz, in office exc lipoma right upper arm and posterior neck    [place] UNLISTED PROCEDURE CARDIAC SURGERY      Stent     SALIVARY GLAND SURGERY      VEIN LIGATION AND STRIPPING      [facility]        Social History     Socioeconomic History    Marital status: Single   Tobacco Use    Smoking status: Every Day     Current packs/day: 0.00     Average packs/day: 1.5 packs/day for 49.0 years (73.4 ttl pk-yrs)     Types: Cigarettes     Start date:      Last attempt to quit: 2024     Years since quittin.5    Smokeless tobacco: Never    Tobacco comments:     Quit smoking: PT VAPES, smokes, quits for awhile and starts   Vaping Use    Vaping status: Some Days    Substances: Flavoring    Devices: Disposable   Substance and Sexual Activity    Alcohol use: No    Drug use: No    Sexual activity: Defer     Social Drivers of Health     Social Connections: Unknown (3/20/2021)    Received from [facility]    Social Connections     Frequency of Communication with Friends and Family: Not asked     Frequency of Social Gatherings with Friends and Family: Not asked   Intimate Partner Violence: Unknown (3/20/2021)    Received from [facility]    Intimate Partner Violence     Fear of Current or Ex-Partner: Not asked     Emotionally Abused: Not asked     Physically Abused: Not asked     Sexually Abused: Not asked   Housing Stability: Not At Risk

## 2025-08-20 ENCOUNTER — OFFICE VISIT (OUTPATIENT)
Age: 70
End: 2025-08-20
Payer: MEDICARE

## 2025-08-20 VITALS
SYSTOLIC BLOOD PRESSURE: 134 MMHG | HEIGHT: 64 IN | HEART RATE: 52 BPM | WEIGHT: 232 LBS | DIASTOLIC BLOOD PRESSURE: 82 MMHG | BODY MASS INDEX: 39.61 KG/M2

## 2025-08-20 DIAGNOSIS — I10 HYPERTENSION, UNSPECIFIED TYPE: ICD-10-CM

## 2025-08-20 DIAGNOSIS — E78.5 HYPERLIPIDEMIA, UNSPECIFIED HYPERLIPIDEMIA TYPE: ICD-10-CM

## 2025-08-20 DIAGNOSIS — Z86.79 HISTORY OF ATRIAL TACHYCARDIA: ICD-10-CM

## 2025-08-20 DIAGNOSIS — I25.10 CAD IN NATIVE ARTERY: Primary | ICD-10-CM

## 2025-08-20 DIAGNOSIS — I49.3 ASYMPTOMATIC PVCS: ICD-10-CM

## 2025-08-20 PROCEDURE — 3079F DIAST BP 80-89 MM HG: CPT | Performed by: INTERNAL MEDICINE

## 2025-08-20 PROCEDURE — 1090F PRES/ABSN URINE INCON ASSESS: CPT | Performed by: INTERNAL MEDICINE

## 2025-08-20 PROCEDURE — G8428 CUR MEDS NOT DOCUMENT: HCPCS | Performed by: INTERNAL MEDICINE

## 2025-08-20 PROCEDURE — 1036F TOBACCO NON-USER: CPT | Performed by: INTERNAL MEDICINE

## 2025-08-20 PROCEDURE — G8400 PT W/DXA NO RESULTS DOC: HCPCS | Performed by: INTERNAL MEDICINE

## 2025-08-20 PROCEDURE — 1126F AMNT PAIN NOTED NONE PRSNT: CPT | Performed by: INTERNAL MEDICINE

## 2025-08-20 PROCEDURE — 3017F COLORECTAL CA SCREEN DOC REV: CPT | Performed by: INTERNAL MEDICINE

## 2025-08-20 PROCEDURE — 99214 OFFICE O/P EST MOD 30 MIN: CPT | Performed by: INTERNAL MEDICINE

## 2025-08-20 PROCEDURE — 1123F ACP DISCUSS/DSCN MKR DOCD: CPT | Performed by: INTERNAL MEDICINE

## 2025-08-20 PROCEDURE — 3075F SYST BP GE 130 - 139MM HG: CPT | Performed by: INTERNAL MEDICINE

## 2025-08-20 PROCEDURE — G8417 CALC BMI ABV UP PARAM F/U: HCPCS | Performed by: INTERNAL MEDICINE

## 2025-08-20 RX ORDER — LOSARTAN POTASSIUM AND HYDROCHLOROTHIAZIDE 12.5; 1 MG/1; MG/1
1 TABLET ORAL DAILY
COMMUNITY
Start: 2025-08-06 | End: 2026-08-06

## 2025-08-20 RX ORDER — ASPIRIN 81 MG/1
81 TABLET, CHEWABLE ORAL DAILY
COMMUNITY

## (undated) DEVICE — CATHETER DIAG AD 5FR L100CM COR NYL JUDKINS R 5 DILATED

## (undated) DEVICE — Z DUPLICATE USE 2103554 VALVE HEMOSTATIC BLEEDBK CTRL COPILOT

## (undated) DEVICE — CATH BLLN DIL 2.5 X12MM RX -- EUPHORA

## (undated) DEVICE — CATHETER ABLATN HIS CRV 2 MM 5 FRX115 CM 8 ELECTRD AUTO ID

## (undated) DEVICE — CATHETER GUID 6FR L100CM GRN PTFE JR4 TRUELUMEN HYBRID

## (undated) DEVICE — GUIDEWIRE VASC J 3 CM 0.014 INX190 CM BAL MIDWT 1001780J

## (undated) DEVICE — PINNACLE TIF INTRODUCER SHEATH: Brand: PINNACLE

## (undated) DEVICE — CATHETER ABLAT 8FR L115CM 1-6-2MM SPC TIP 3.5MM FJ CRV

## (undated) DEVICE — BAND COMPR L24CM REG CLR PLAS HEMSTAT EXT HK AND LOOP RETEN

## (undated) DEVICE — NC TREK CORONARY DILATATION CATHETER 3.5 MM X 15 MM / RAPID-EXCHANGE: Brand: NC TREK

## (undated) DEVICE — GLIDESHEATH SLENDER STAINLESS STEEL KIT: Brand: GLIDESHEATH SLENDER

## (undated) DEVICE — CATH EP MAP 2-6-2 7FR D CRV -- PENTARAY

## (undated) DEVICE — PATCH CARTO 3 EXT REF --

## (undated) DEVICE — SYSTEM CLOSURE 6-12 FR VEN VASC VASCADE MVP

## (undated) DEVICE — ANGIO-SEAL VIP VASCULAR CLOSURE DEVICE: Brand: ANGIO-SEAL

## (undated) DEVICE — TUBE SET IRR PUMP THERMALCOOL -- SMARTABLATE

## (undated) DEVICE — 18G NG KIT WITH 96IN PROBE COVER (10 PK): Brand: SITE-RITE

## (undated) DEVICE — CATHETER ANGIO 5FR L100CM GRY S STL NYL JL3.5 3 SEG BRAID L

## (undated) DEVICE — INTRODUCER SHTH 6FR L11CM 0.038IN STD SIDEPRT EXTN 3 W

## (undated) DEVICE — GUIDEWIRE VASC L260CM DIA0.035IN RAD 3MM J TIP L7CM PTFE

## (undated) DEVICE — CATHETER DIAG AD 5FR L110CM 145DEG COR GRY HYDRPHLC NYL

## (undated) DEVICE — PINNACLE INTRODUCER SHEATH: Brand: PINNACLE

## (undated) DEVICE — CATH DECANAV EP F CURVE 7FR --

## (undated) DEVICE — CATHETER US GE COMPATIBILITY SOUNDSTAR ECO 10FR

## (undated) DEVICE — CATH GUID COR EB30 5FR 100CM -- LAUNCHER